# Patient Record
Sex: MALE | Race: AMERICAN INDIAN OR ALASKA NATIVE
[De-identification: names, ages, dates, MRNs, and addresses within clinical notes are randomized per-mention and may not be internally consistent; named-entity substitution may affect disease eponyms.]

---

## 2019-07-16 ENCOUNTER — HOSPITAL ENCOUNTER (EMERGENCY)
Dept: HOSPITAL 5 - ED | Age: 39
LOS: 1 days | Discharge: HOME | End: 2019-07-17
Payer: COMMERCIAL

## 2019-07-16 VITALS — DIASTOLIC BLOOD PRESSURE: 76 MMHG | SYSTOLIC BLOOD PRESSURE: 105 MMHG

## 2019-07-16 DIAGNOSIS — S09.90XA: ICD-10-CM
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
## 2019-07-16 DIAGNOSIS — M54.9: ICD-10-CM

## 2019-07-16 DIAGNOSIS — F17.200: ICD-10-CM
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
## 2019-07-16 DIAGNOSIS — S16.1XXA: Primary | ICD-10-CM

## 2019-07-16 PROCEDURE — 72125 CT NECK SPINE W/O DYE: CPT

## 2019-07-16 PROCEDURE — 70450 CT HEAD/BRAIN W/O DYE: CPT
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
## 2020-08-06 ENCOUNTER — HOSPITAL ENCOUNTER (EMERGENCY)
Dept: HOSPITAL 5 - ED | Age: 40
Discharge: LEFT BEFORE DISCHARGE | End: 2020-08-06
Payer: SELF-PAY

## 2020-08-06 VITALS — SYSTOLIC BLOOD PRESSURE: 141 MMHG | DIASTOLIC BLOOD PRESSURE: 101 MMHG

## 2020-08-06 DIAGNOSIS — Z53.21: ICD-10-CM

## 2020-08-06 DIAGNOSIS — M54.9: Primary | ICD-10-CM

## 2020-08-06 NOTE — EMERGENCY DEPARTMENT REPORT
Chief Complaint: Back Pain/Injury


Stated Complaint: BACK PAIN





- HPI


History of Present Illness: 





40-year-old -American male presents to the emergency room stating that he

hurt his back this morning while moving a washing machine and dryer.  Patient 

states that it is more on his left side.  Patient reports is worse with twisting

and movement.  Patient denies any urinary or bowel incontinent.  Patient is 

taken nothing for his pain.  Patient denies any direct trauma to his back.  

Patient denies any past medical history takes no medications on a daily basis 

and has no known drug allergies.





- Exam


Vital Signs: 


                                   Vital Signs











  08/06/20





  09:24


 


Temperature 98.3 F


 


Pulse Rate 95 H


 


Respiratory 20





Rate 


 


Blood Pressure 141/101


 


O2 Sat by Pulse 99





Oximetry 











Physical Exam: 





Alert and oriented x3 no acute distress


Back full range of motion no vertebral tenderness left side muscle tenderness


Ambulatory without difficulties.


MSE screening note: 


Focused history and physical exam performed.


Due to findings the following was ordered:





40-year-old -American male presents to the emergency room stating that he

hurt his back this morning while moving a washing machine and dryer.  Patient 

states that it is more on his left side.  Patient reports is worse with twisting

and movement.  Patient denies any urinary or bowel incontinent.  Patient is 

taken nothing for his pain.  Patient denies any direct trauma to his back.  

Patient denies any past medical history takes no medications on a daily basis 

and has no known drug allergies.














You can take over-the-counter ibuprofen and increase your water intake and 

follow-up with your primary care provider.





ED Disposition [place] [facility]


Disposition: Z-07 MED SCREENING EXAM-LEFT


Is pt being admited?: No


Does the pt Need Aspirin: No


Condition: Stable


Instructions:  Low Back Strain (ED)


Additional Instructions: 


You can take over-the-counter ibuprofen and increase your water intake and 

follow-up with your primary care provider.


Referrals: 


[facility] [Primary Care Provider] - 3-5 Days


[facility] [Outside] - 3-5 Days


[facility] [Provider Group] - 3-5 Days

## 2021-07-27 ENCOUNTER — HOSPITAL ENCOUNTER (INPATIENT)
Dept: HOSPITAL 5 - ED | Age: 41
LOS: 7 days | Discharge: HOME | DRG: 326 | End: 2021-08-03
Attending: INTERNAL MEDICINE | Admitting: INTERNAL MEDICINE
Payer: SELF-PAY

## 2021-07-27 DIAGNOSIS — E86.0: ICD-10-CM

## 2021-07-27 DIAGNOSIS — Z79.899: ICD-10-CM

## 2021-07-27 DIAGNOSIS — K25.5: Primary | ICD-10-CM

## 2021-07-27 DIAGNOSIS — K65.9: ICD-10-CM

## 2021-07-27 DIAGNOSIS — K56.7: ICD-10-CM

## 2021-07-27 DIAGNOSIS — Z82.49: ICD-10-CM

## 2021-07-27 LAB
ALBUMIN SERPL-MCNC: 4.4 G/DL (ref 3.9–5)
ALT SERPL-CCNC: 21 UNITS/L (ref 7–56)
BAND NEUTROPHILS # (MANUAL): 1 K/MM3
BILIRUB DIRECT SERPL-MCNC: < 0.2 MG/DL (ref 0–0.2)
BILIRUB UR QL STRIP: (no result)
BLOOD UR QL VISUAL: (no result)
BUN SERPL-MCNC: 12 MG/DL (ref 9–20)
BUN/CREAT SERPL: 10 %
CALCIUM SERPL-MCNC: 9.6 MG/DL (ref 8.4–10.2)
HCT VFR BLD CALC: 47.6 % (ref 35.5–45.6)
HEMOLYSIS INDEX: 37
HGB BLD-MCNC: 15.9 GM/DL (ref 11.8–15.2)
MCHC RBC AUTO-ENTMCNC: 33 % (ref 32–34)
MCV RBC AUTO: 93 FL (ref 84–94)
MUCOUS THREADS #/AREA URNS HPF: (no result) /HPF
MYELOCYTES # (MANUAL): 0 K/MM3
PH UR STRIP: 5 [PH] (ref 5–7)
PLATELET # BLD: 301 K/MM3 (ref 140–440)
PROMYELOCYTES # (MANUAL): 0 K/MM3
PROT UR STRIP-MCNC: (no result) MG/DL
RBC # BLD AUTO: 5.12 M/MM3 (ref 3.65–5.03)
RBC #/AREA URNS HPF: < 1 /HPF (ref 0–6)
TOTAL CELLS COUNTED BLD: 100
UROBILINOGEN UR-MCNC: < 2 MG/DL (ref ?–2)
WBC #/AREA URNS HPF: < 1 /HPF (ref 0–6)

## 2021-07-27 PROCEDURE — 99292 CRITICAL CARE ADDL 30 MIN: CPT

## 2021-07-27 PROCEDURE — 87075 CULTR BACTERIA EXCEPT BLOOD: CPT

## 2021-07-27 PROCEDURE — 85025 COMPLETE CBC W/AUTO DIFF WBC: CPT

## 2021-07-27 PROCEDURE — 74177 CT ABD & PELVIS W/CONTRAST: CPT

## 2021-07-27 PROCEDURE — 80048 BASIC METABOLIC PNL TOTAL CA: CPT

## 2021-07-27 PROCEDURE — 74019 RADEX ABDOMEN 2 VIEWS: CPT

## 2021-07-27 PROCEDURE — 83690 ASSAY OF LIPASE: CPT

## 2021-07-27 PROCEDURE — 83036 HEMOGLOBIN GLYCOSYLATED A1C: CPT

## 2021-07-27 PROCEDURE — C9113 INJ PANTOPRAZOLE SODIUM, VIA: HCPCS

## 2021-07-27 PROCEDURE — 74220 X-RAY XM ESOPHAGUS 1CNTRST: CPT

## 2021-07-27 PROCEDURE — 74246 X-RAY XM UPR GI TRC 2CNTRST: CPT

## 2021-07-27 PROCEDURE — 36415 COLL VENOUS BLD VENIPUNCTURE: CPT

## 2021-07-27 PROCEDURE — 81001 URINALYSIS AUTO W/SCOPE: CPT

## 2021-07-27 PROCEDURE — 80053 COMPREHEN METABOLIC PANEL: CPT

## 2021-07-27 PROCEDURE — 71045 X-RAY EXAM CHEST 1 VIEW: CPT

## 2021-07-27 PROCEDURE — 74022 RADEX COMPL AQT ABD SERIES: CPT

## 2021-07-27 PROCEDURE — 87116 MYCOBACTERIA CULTURE: CPT

## 2021-07-27 PROCEDURE — 96375 TX/PRO/DX INJ NEW DRUG ADDON: CPT

## 2021-07-27 PROCEDURE — 80307 DRUG TEST PRSMV CHEM ANLYZR: CPT

## 2021-07-27 PROCEDURE — 96372 THER/PROPH/DIAG INJ SC/IM: CPT

## 2021-07-27 PROCEDURE — 80076 HEPATIC FUNCTION PANEL: CPT

## 2021-07-27 PROCEDURE — 96365 THER/PROPH/DIAG IV INF INIT: CPT

## 2021-07-27 PROCEDURE — 0DU607Z SUPPLEMENT STOMACH WITH AUTOLOGOUS TISSUE SUBSTITUTE, OPEN APPROACH: ICD-10-PCS | Performed by: SURGERY

## 2021-07-27 PROCEDURE — 85007 BL SMEAR W/DIFF WBC COUNT: CPT

## 2021-07-27 RX ADMIN — HYDROMORPHONE HYDROCHLORIDE PRN MG: 1 INJECTION, SOLUTION INTRAMUSCULAR; INTRAVENOUS; SUBCUTANEOUS at 20:17

## 2021-07-27 RX ADMIN — HYDROMORPHONE HYDROCHLORIDE PRN MG: 1 INJECTION, SOLUTION INTRAMUSCULAR; INTRAVENOUS; SUBCUTANEOUS at 20:07
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
## 2021-07-27 NOTE — CAT SCAN REPORT
CT ABDOMEN AND PELVIS WITH IV CONTRAST



INDICATION:

abd pain omni 300 100ml . Known pneumoperitoneum



COMPARISON:

None available.



TECHNIQUE:

Axial CT images were obtained through the abdomen and pelvis after 100 mL IV contrast. All CT scans a
t this location are performed using CT dose reduction for ALARA by means of automated exposure contro
l. 



FINDINGS --



Abdomen and pelvis: Severe and extensive free intraperitoneal air and fluid identified throughout the
 abdomen especially the upper abdomen. There is patchy bibasilar consolidation within both lower lung
s, right worse than left consistent with mild pneumonitis/atelectasis. There is free gas adjacent to 
the distal thoracic esophagus just above the level of the esophageal hiatus.



There are multiple dilated loops of small bowel within the left mid and lower abdomen. The duodenum a
ppears dilated. A transition is difficult to discern but appears to be near the midline mid abdomen.



The liver, spleen, pancreas adrenal glands and kidneys are grossly unremarkable within the limits of 
the exam given significant amount of respiratory motion artifact. The gallbladder demonstrates modera
te pericholecystic fluid however this could be secondary to significant free intra-abdominal fluid. T
he proximal duodenum is fluid distended. Multiple cysts are present within the kidneys. Abdominal aor
ta is normal in size. The appendix is poorly identified on this exam. The urinary bladder is fluid di
stended. Moderate free pelvic fluid is noted. A+ free gas is present within the lower pelvis.



Review of bone windows demonstrates mild thoracolumbar degenerative change.



IMPRESSION:

1. Severe and extensive pneumoperitoneum with free fluid throughout much of the abdomen but especiall
y the right upper abdomen. Overall the findings are most consistent with a perforated viscus within t
he upper GI tract however the exact location is somewhat uncertain especially given the degree of mot
ion artifact. Primary considerations of areas of possible perforation include the proximal duodenum (
perforated ulcer) versus the distal thoracic esophagus. Upper endoscopy may be useful for further turner
luation.





2. Multiple dilated loops of small bowel primarily involving the left mid abdomen with gradual transi
tion somewhere near the mid abdomen. It is unclear if this dilatation is primarily secondary to acute
 peritonitis from perforated viscus versus newly developing small bowel obstruction.



Signer Name: Jalen Murray MD 

Signed: 7/27/2021 11:29 AM

Workstation Name: [email or web address]

## 2021-07-27 NOTE — XRAY REPORT
CHEST 1 VIEW 7/27/2021 4:18 PM



INDICATION / CLINICAL INFORMATION: r/o esophageal perforation.



COMPARISON: July 27 at 10:28 AM



FINDINGS:



SUPPORT DEVICES: None.

HEART / MEDIASTINUM: No significant abnormality. 

LUNGS / PLEURA: There is increased opacity within the right lung base, likely representing compressiv
e atelectasis No pneumothorax. 



ADDITIONAL FINDINGS: Redemonstrated subdiaphragmatic air. Contrast material is noted within the stoma
ch extending into the duodenum.



IMPRESSION:

1. Redemonstrated pneumoperitoneum. Increased opacity within the right lung base, likely representing
 compressive atelectasis.



Signer Name: Maury Connelly DO 

Signed: 7/27/2021 5:40 PM

Workstation Name: [id number]

## 2021-07-27 NOTE — CONSULTATION
History of Present Illness


Consult date: 07/27/21


Reason for consult: abdominal pain





- History of present illness


History of present illness: 





40 yo male presents to the ED with 3 hour h/o diffuse abdominal pain, nausea and

vomiting after eating yogurt and milk.  Denies hematemesis, melena, significant 

alcohol intake or h/o PUD.  No OTC NSAID use.  





Medications and Allergies


                                    Allergies











Allergy/AdvReac Type Severity Reaction Status Date / Time


 


No Known Allergies Allergy   Verified 08/06/20 09:22











                                Home Medications











 Medication  Instructions  Recorded  Confirmed  Last Taken  Type


 


HYDROcodone/APAP 5-325 [[place] 1 each PO Q6HR PRN #12 tablet 10/03/18  Unknown Rx





5/325]     


 


Sulfamethoxazole/Trimethoprim 1 each PO BID #14 tablet 10/03/18  Unknown Rx





[Bactrim DS TAB]     


 


Ketorolac [Toradol] 10 mg PO Q6H PRN #14 tablet 10/13/18  Unknown Rx


 


methOCARBAMOL [Robaxin TAB] 500 mg PO Q6H PRN #20 tablet 10/13/18  Unknown Rx


 


Baclofen [Lioresal] 10 mg PO TID #15 tab 07/17/19  Unknown Rx


 


Ibuprofen [Motrin 600 MG tab] 600 mg PO Q8H PRN #30 tablet 07/17/19  Unknown Rx











Active Meds: 


Active Medications





Sodium Chloride (Nacl 0.9% 1000 Ml)  1,000 mls @ 125 mls/hr IV ONCE ONE


   Stop: 07/27/21 22:50











Review of Systems


All systems: negative (none)





Exam


                                   Vital Signs











Pulse Ox


 


 64 L


 


 07/27/21 09:01














- General physical appearance


Positive: well developed, well nourished, no distress





- Eyes


Positive: PERRL, normal occular movement





- ENT


Positive: normal pinna, normal nares, normal mucosa, no hearing loss, no 

congestion





- Neck


Positive: no masses, no bruits, trachea midline, no venous distension





- Respiratory


Positive: normal expansion, normal respiratory effort, clear to auscultation





- Cardiovascular


Rhythm: regular


Heart Sounds: Present: S1 & S2.  Absent: rub, click





- Extremities


Extremities: no ischemia, pulses symmetrical, No edema





- Breasts


Breasts: normal, no mass, no skin changes





- Abdomen


Abdomen: Present: other (Flat with diffuse tenderness, rebound and guarding.  BS

hypoactive.  No hernias.)


Hernia: none





- Genitourinary


Male Genitourinary: normal


Female Genitourinary: normal





- Integumentary


no rash, no growths, no abnormal pigmentation





- Neurologic


Neurologic: alert and oriented to time, place and person, motor strength and 

sensation are grossly intact





- Musculoskeletal


normal gait, normal posture





- Psychiatric


Psychiatric: appropriate mood/affect, intact judgment & insight





Results





- Labs





                                 07/27/21 09:44





                                 07/27/21 09:44


                              Abnormal lab results











  07/27/21 07/27/21 Range/Units





  09:44 09:44 


 


RBC  5.12 H   (3.65-5.03)  M/mm3


 


Hgb  15.9 H   (11.8-15.2)  gm/dl


 


Hct  47.6 H   (35.5-45.6)  %


 


RDW  16.3 H   (13.2-15.2)  %


 


Seg Neuts % (Manual)  75.0 H   (40.0-70.0)  %


 


Lymphocytes % (Manual)  9.0 L   (13.4-35.0)  %


 


Lymphocytes # (Manual)  0.6 L   (1.2-5.4)  K/mm3


 


Glucose   133 H  ()  mg/dL


 


Lipase   99 H  (13-60)  units/L








                                 Diabetes panel











  07/27/21 Range/Units





  09:44 


 


Sodium  137  (137-145)  mmol/L


 


Potassium  3.9  (3.6-5.0)  mmol/L


 


Chloride  98.6  ()  mmol/L


 


Carbon Dioxide  23  (22-30)  mmol/L


 


BUN  12  (9-20)  mg/dL


 


Creatinine  1.2  (0.8-1.3)  mg/dL


 


Glucose  133 H  ()  mg/dL


 


Calcium  9.6  (8.4-10.2)  mg/dL


 


AST  40  (5-40)  units/L


 


ALT  21  (7-56)  units/L


 


Alkaline Phosphatase  56  ()  units/L


 


Total Protein  6.8  (6.3-8.2)  g/dL


 


Albumin  4.4  (3.9-5)  g/dL








                                  Calcium panel











  07/27/21 Range/Units





  09:44 


 


Calcium  9.6  (8.4-10.2)  mg/dL


 


Albumin  4.4  (3.9-5)  g/dL








                                 Pituitary panel











  07/27/21 Range/Units





  09:44 


 


Sodium  137  (137-145)  mmol/L


 


Potassium  3.9  (3.6-5.0)  mmol/L


 


Chloride  98.6  ()  mmol/L


 


Carbon Dioxide  23  (22-30)  mmol/L


 


BUN  12  (9-20)  mg/dL


 


Creatinine  1.2  (0.8-1.3)  mg/dL


 


Glucose  133 H  ()  mg/dL


 


Calcium  9.6  (8.4-10.2)  mg/dL








                                  Adrenal panel











  07/27/21 Range/Units





  09:44 


 


Sodium  137  (137-145)  mmol/L


 


Potassium  3.9  (3.6-5.0)  mmol/L


 


Chloride  98.6  ()  mmol/L


 


Carbon Dioxide  23  (22-30)  mmol/L


 


BUN  12  (9-20)  mg/dL


 


Creatinine  1.2  (0.8-1.3)  mg/dL


 


Glucose  133 H  ()  mg/dL


 


Calcium  9.6  (8.4-10.2)  mg/dL


 


Total Bilirubin  0.60  (0.1-1.2)  mg/dL


 


AST  40  (5-40)  units/L


 


ALT  21  (7-56)  units/L


 


Alkaline Phosphatase  56  ()  units/L


 


Total Protein  6.8  (6.3-8.2)  g/dL


 


Albumin  4.4  (3.9-5)  g/dL














- Imaging


Chest x-ray: image reviewed


Abdominal x-ray: image reviewed


CT scan - abdomen: report reviewed


CT scan - pelvis: report reviewed





Assessment and Plan





- Patient Problems


(1) Perforated abdominal viscus


Current Visit: Yes   Status: Acute   


Plan to address problem: 


1) IV Zosyn


 2) IV Protonix


 3) To OR for exploratory laparotomy and indicated surgery.

## 2021-07-27 NOTE — PROCEDURE NOTE
Date of procedure: 07/27/21


Pre-op diagnosis: Perforated viscous with pneumoperitoneum


Post-op diagnosis: same (secondary to perforated gastric ulcer)


Procedure: 





1) Oversewing of perforated 


2) Omentoplasty





Description of procedure:  Pt was placed supine on the OR table.  GETA was 

administered.  Abdomen was prepped and draped.  Peritoneal cavity was entered 

via a vertical midline incision.  A large amount of bile colored fluid was 

present in the peritoneal cavity.  This was collected for C&S and suctioned.  

The perforation was immediately identified in the anterior wall of the proximal 

gastric antrum.  The perforation was approximately 8 mm in diameter.  The 

perforation was closed with 2 sutures of 2-0 silk.  An omentoplasty was then 

performed over the perforation by securing the omentum over the perforation 

using the previously placed sutures of 2-0 silk.  Peritoneal cavity was 

irrigated with 2 liters of warm saline.  Midline fascia was closed with a 

running, looped 0-PDS suture.  SQ tissue was packed open with a dilute Betadine 

moistened Kerlix roll followed by dry 4 X 4's, ABD and Medipore tape.  Pt 

tolerated the procedure well.  Pt was extubated in the OR and was taken to PACU 

in stable condtion.


Anesthesia: GETA


Surgeon: MARISELA OJEDA


Estimated blood loss: minimal


Pathology: list (C&S of free intra-peritoneal fluid)


Specimen disposition: to lab


Condition: stable


Disposition: PACU

## 2021-07-27 NOTE — XRAY REPORT
XR abd series w cxr 1V



INDICATION / CLINICAL INFORMATION:

abd pain.



COMPARISON: 

None available.



FINDINGS:



SUPPORT DEVICES: None.



HEART / MEDIASTINUM: No significant abnormality. 



LUNGS / PLEURA: Lungs are clear.  Costophrenic sulci are sharp. No pneumothorax.



ABDOMEN: Free air noted underlying the right and left hemidiaphragms. No other abnormal findings with
in the abdomen.



ADDITIONAL FINDINGS: No significant additional findings.



IMPRESSION:

Pneumoperitoneum.



============================================

CRITICAL RESULT

Time of Communication (CST/CDT): 9:54 AM

Licensed Practitioner Receiving Report: Dr. Coronado 

Read-Back Performed: Yes.





============================================



Signer Name: Miquel Sauceda MD 

Signed: 7/27/2021 10:54 AM

Workstation Name: [email or web address]

## 2021-07-27 NOTE — ANESTHESIA CONSULTATION
Anesthesia Consult and Med Hx


Date of service: 07/27/21





- Airway


Anesthetic Teeth Evaluation: Caps, Crowns


ROM Head & Neck: Adequate


Mental/Hyoid Distance: Adequate


Mallampati Class: Class II


Intubation Access Assessment: Probably Good





- Pre-Operative Health Status


ASA Pre-Surgery Classification: ASA2, Emergency


Proposed Anesthetic Plan: General





- Pre-Anesthesia Comment


Pre-Anesthesia Comments: Pt not very cooperative and not answering questions. 

Information primarily from chart





- Central Nervous System


Hx Back Pain: Yes

## 2021-07-27 NOTE — ANESTHESIA DAY OF SURGERY
Anesthesia Day of Surgery





- Day of Surgery


Patient Examined: Yes


Patient H&P Reviewed: Yes


Patient is NPO: No (Had oral contrast)

## 2021-07-27 NOTE — FLUOROSCOPY REPORT
BARIUM SWALLOW



HISTORY: Severe abdominal pain, free air on recent CT, evaluate for esophageal perforation



FINDINGS: Multiple attempts were made to get the patient to ingest Gastrografin to evaluate for an es
ophageal leak/perforation. The patient was unable to ingest the oral contrast agent secondary to liang
re pain. These findings were discussed with Dr. Coronado in the emergency department.



IMPRESSION:

Unsuccessful exam due to patient condition. See above.



Fluoroscopy time: 0.3 minutes.

Fluoroscopic images:1



Signer Name: Mor Dale Jr, MD 

Signed: 7/27/2021 1:54 PM

Workstation Name: [id number]

## 2021-07-28 LAB
BASOPHILS # (AUTO): 0 K/MM3 (ref 0–0.1)
BASOPHILS NFR BLD AUTO: 0.1 % (ref 0–1.8)
BUN SERPL-MCNC: 11 MG/DL (ref 9–20)
BUN/CREAT SERPL: 12 %
CALCIUM SERPL-MCNC: 8.3 MG/DL (ref 8.4–10.2)
EOSINOPHIL # BLD AUTO: 0 K/MM3 (ref 0–0.4)
EOSINOPHIL NFR BLD AUTO: 0 % (ref 0–4.3)
HCT VFR BLD CALC: 46.2 % (ref 35.5–45.6)
HEMOLYSIS INDEX: 7
HGB BLD-MCNC: 15.6 GM/DL (ref 11.8–15.2)
LYMPHOCYTES # BLD AUTO: 0.6 K/MM3 (ref 1.2–5.4)
LYMPHOCYTES NFR BLD AUTO: 8.5 % (ref 13.4–35)
MCHC RBC AUTO-ENTMCNC: 34 % (ref 32–34)
MCV RBC AUTO: 93 FL (ref 84–94)
MONOCYTES # (AUTO): 0.7 K/MM3 (ref 0–0.8)
MONOCYTES % (AUTO): 9.3 % (ref 0–7.3)
PLATELET # BLD: 241 K/MM3 (ref 140–440)
RBC # BLD AUTO: 4.99 M/MM3 (ref 3.65–5.03)

## 2021-07-28 RX ADMIN — Medication SCH ML: at 22:35

## 2021-07-28 RX ADMIN — MORPHINE SULFATE PRN MG: 2 INJECTION, SOLUTION INTRAMUSCULAR; INTRAVENOUS at 06:43

## 2021-07-28 RX ADMIN — HYDROMORPHONE HYDROCHLORIDE PRN MG: 1 INJECTION, SOLUTION INTRAMUSCULAR; INTRAVENOUS; SUBCUTANEOUS at 11:15

## 2021-07-28 RX ADMIN — DEXTROSE AND SODIUM CHLORIDE SCH MLS/HR: 5; .9 INJECTION, SOLUTION INTRAVENOUS at 22:40

## 2021-07-28 RX ADMIN — PIPERACILLIN AND TAZOBACTAM SCH MLS/HR: 4; .5 INJECTION, POWDER, FOR SOLUTION INTRAVENOUS at 22:34

## 2021-07-28 RX ADMIN — HYDROMORPHONE HYDROCHLORIDE PRN MG: 1 INJECTION, SOLUTION INTRAMUSCULAR; INTRAVENOUS; SUBCUTANEOUS at 22:35

## 2021-07-28 RX ADMIN — PANTOPRAZOLE SODIUM SCH MG: 40 INJECTION, POWDER, FOR SOLUTION INTRAVENOUS at 22:34

## 2021-07-28 RX ADMIN — MORPHINE SULFATE PRN MG: 2 INJECTION, SOLUTION INTRAMUSCULAR; INTRAVENOUS at 19:35

## 2021-07-28 RX ADMIN — DEXTROSE AND SODIUM CHLORIDE SCH MLS/HR: 5; .9 INJECTION, SOLUTION INTRAVENOUS at 06:42

## 2021-07-28 RX ADMIN — PANTOPRAZOLE SODIUM SCH MG: 40 INJECTION, POWDER, FOR SOLUTION INTRAVENOUS at 22:33

## 2021-07-28 RX ADMIN — PIPERACILLIN AND TAZOBACTAM SCH MLS/HR: 4; .5 INJECTION, POWDER, FOR SOLUTION INTRAVENOUS at 13:56

## 2021-07-28 RX ADMIN — PANTOPRAZOLE SODIUM SCH MG: 40 INJECTION, POWDER, FOR SOLUTION INTRAVENOUS at 13:49

## 2021-07-28 RX ADMIN — Medication SCH ML: at 13:50

## 2021-07-28 NOTE — PROGRESS NOTE
Assessment and Plan


Assessment and plan: 





(1) Perforated abdominal viscus


Current Visit: Yes   Status: Acute   


Plan to address problem: 


Patient was taken for emergent abdominal surgery for repair of perforated viscus


Perforated viscus may be in the duodenum or the lower esophagus


Dr. Manley consulted


N.p.o. for now


IVF Zosyn for now








(2) Dehydration


Current Visit: Yes   Status: Acute   


Plan to address problem: 


IV fluids for now








(3) Peritonitis


Current Visit: Yes   Status: Acute   


Plan to address problem: 


Secondary to perforation


IV Zosyn for now








(4) DVT prophylaxis


Current Visit: Yes   Status: Acute   


Plan to address problem: 


On SCDs and GI prophylaxis





7/28


-Status post patch worsening of the perforated gastric ulcer and omentoplasty by

Dr. Manley


-Continue IV antibiotics for now


-We will follow Dr. Manley's recommendation


-I ordered IV PPI


-Cocaine use; counseled about cessation of using cocaine





History


Interval history: 


Patient was seen and evaluated this morning


Patient said he has abdominal pain








Hospitalist Physical





- Physical exam


Narrative exam: 





 Not in cardiopulmonary distress. 


 The patient appeared well nourished and normally developed.


 Vital signs as documented.


 Head exam is unremarkable.


 No scleral icterus .


 Neck is without jugular venous distension, thyromegaly, or carotid bruits. 


 Lungs are clear to auscultation.


Cardiac exam reveals regular rate and  Rhythm. 


Abdominal exam reveals moderate tenderness.


Extremities are nonedematous and both femoral and pedal pulses are normal.


CNS: Alert and oriented 3.  No focal weakness.





- Constitutional


Vitals: 


                                        











Temp Pulse Resp BP Pulse Ox


 


 98.7 F   90   18   105/70   95 


 


 07/28/21 07:54  07/28/21 07:54  07/28/21 07:54  07/28/21 07:54  07/28/21 07:54











General appearance: Present: no acute distress, well-nourished





Results





- Labs


CBC & Chem 7: 


                                 07/28/21 06:41





                                 07/28/21 06:41


Labs: 


                             Laboratory Last Values











WBC  7.0 K/mm3 (4.5-11.0)   07/28/21  06:41    


 


RBC  4.99 M/mm3 (3.65-5.03)   07/28/21  06:41    


 


Hgb  15.6 gm/dl (11.8-15.2)  H  07/28/21  06:41    


 


Hct  46.2 % (35.5-45.6)  H  07/28/21  06:41    


 


MCV  93 fl (84-94)   07/28/21  06:41    


 


MCH  31 pg (28-32)   07/28/21  06:41    


 


MCHC  34 % (32-34)   07/28/21  06:41    


 


RDW  16.0 % (13.2-15.2)  H  07/28/21  06:41    


 


Plt Count  241 K/mm3 (140-440)   07/28/21  06:41    


 


Lymph % (Auto)  8.5 % (13.4-35.0)  L  07/28/21  06:41    


 


Mono % (Auto)  9.3 % (0.0-7.3)  H  07/28/21  06:41    


 


Eos % (Auto)  0.0 % (0.0-4.3)   07/28/21  06:41    


 


Baso % (Auto)  0.1 % (0.0-1.8)   07/28/21  06:41    


 


Lymph # (Auto)  0.6 K/mm3 (1.2-5.4)  L  07/28/21  06:41    


 


Mono # (Auto)  0.7 K/mm3 (0.0-0.8)   07/28/21  06:41    


 


Eos # (Auto)  0.0 K/mm3 (0.0-0.4)   07/28/21  06:41    


 


Baso # (Auto)  0.0 K/mm3 (0.0-0.1)   07/28/21  06:41    


 


Add Manual Diff  Complete   07/27/21  09:44    


 


Total Counted  100   07/27/21  09:44    


 


Seg Neutrophils %  82.1 % (40.0-70.0)  H  07/28/21  06:41    


 


Seg Neuts % (Manual)  75.0 % (40.0-70.0)  H  07/27/21  09:44    


 


Band Neutrophils %  14.0 %  07/27/21  09:44    


 


Lymphocytes % (Manual)  9.0 % (13.4-35.0)  L  07/27/21  09:44    


 


Monocytes % (Manual)  2.0 % (0.0-7.3)   07/27/21  09:44    


 


Nucleated RBC %  Not Reportable   07/27/21  09:44    


 


Seg Neutrophils #  5.7 K/mm3 (1.8-7.7)   07/28/21  06:41    


 


Seg Neutrophils # Man  5.3 K/mm3 (1.8-7.7)   07/27/21  09:44    


 


Band Neutrophils #  1.0 K/mm3  07/27/21  09:44    


 


Lymphocytes # (Manual)  0.6 K/mm3 (1.2-5.4)  L  07/27/21  09:44    


 


Abs React Lymphs (Man)  0.0 K/mm3  07/27/21  09:44    


 


Monocytes # (Manual)  0.1 K/mm3 (0.0-0.8)   07/27/21  09:44    


 


Eosinophils # (Manual)  0.0 K/mm3 (0.0-0.4)   07/27/21  09:44    


 


Basophils # (Manual)  0.0 K/mm3 (0.0-0.1)   07/27/21  09:44    


 


Metamyelocytes #  0.0 K/mm3  07/27/21  09:44    


 


Myelocytes #  0.0 K/mm3  07/27/21  09:44    


 


Promyelocytes #  0.0 K/mm3  07/27/21  09:44    


 


Blast Cells #  0.0 K/mm3  07/27/21  09:44    


 


WBC Morphology  Not Reportable   07/27/21  09:44    


 


Hypersegmented Neuts  Not Reportable   07/27/21  09:44    


 


Hyposegmented Neuts  Not Reportable   07/27/21  09:44    


 


Hypogranular Neuts  Not Reportable   07/27/21  09:44    


 


Smudge Cells  Not Reportable   07/27/21  09:44    


 


Toxic Granulation  Not Reportable   07/27/21  09:44    


 


Toxic Vacuolation  Not Reportable   07/27/21  09:44    


 


Dohle Bodies  Not Reportable   07/27/21  09:44    


 


Pelger-Huet Anomaly  Not Reportable   07/27/21  09:44    


 


Kerline Rods  Not Reportable   07/27/21  09:44    


 


Platelet Estimate  Consistent w auto   07/27/21  09:44    


 


Clumped Platelets  Not Reportable   07/27/21  09:44    


 


Plt Clumps, EDTA  Not Reportable   07/27/21  09:44    


 


Large Platelets  Not Reportable   07/27/21  09:44    


 


Giant Platelets  Not Reportable   07/27/21  09:44    


 


Platelet Satelliting  Not Reportable   07/27/21  09:44    


 


Plt Morphology Comment  Not Reportable   07/27/21  09:44    


 


RBC Morphology  Normal   07/27/21  09:44    


 


Dimorphic RBCs  Not Reportable   07/27/21  09:44    


 


Polychromasia  Not Reportable   07/27/21  09:44    


 


Hypochromasia  Not Reportable   07/27/21  09:44    


 


Poikilocytosis  Not Reportable   07/27/21  09:44    


 


Anisocytosis  Not Reportable   07/27/21  09:44    


 


Microcytosis  Not Reportable   07/27/21  09:44    


 


Macrocytosis  Not Reportable   07/27/21  09:44    


 


Spherocytes  Not Reportable   07/27/21  09:44    


 


Pappenheimer Bodies  Not Reportable   07/27/21  09:44    


 


Sickle Cells  Not Reportable   07/27/21  09:44    


 


Target Cells  Not Reportable   07/27/21  09:44    


 


Tear Drop Cells  Not Reportable   07/27/21  09:44    


 


Ovalocytes  Not Reportable   07/27/21  09:44    


 


Helmet Cells  Not Reportable   07/27/21  09:44    


 


[place] Bodies  Not Reportable   07/27/21  09:44    


 


Cabot Rings  Not Reportable   07/27/21  09:44    


 


Glenn Cells  Not Reportable   07/27/21  09:44    


 


Bite Cells  Not Reportable   07/27/21  09:44    


 


Crenated Cell  Not Reportable   07/27/21  09:44    


 


Elliptocytes  Not Reportable   07/27/21  09:44    


 


Acanthocytes (Spur)  Not Reportable   07/27/21  09:44    


 


Rouleaux  Not Reportable   07/27/21  09:44    


 


Hemoglobin C Crystals  Not Reportable   07/27/21  09:44    


 


Schistocytes  Not Reportable   07/27/21  09:44    


 


Malaria parasites  Not Reportable   07/27/21  09:44    


 


Garo Bodies  Not Reportable   07/27/21  09:44    


 


Hem Pathologist Commnt  No   07/27/21  09:44    


 


Sodium  136 mmol/L (137-145)  L  07/28/21  06:41    


 


Potassium  4.5 mmol/L (3.6-5.0)   07/28/21  06:41    


 


Chloride  102.8 mmol/L ()   07/28/21  06:41    


 


Carbon Dioxide  22 mmol/L (22-30)   07/28/21  06:41    


 


Anion Gap  16 mmol/L  07/28/21  06:41    


 


BUN  11 mg/dL (9-20)   07/28/21  06:41    


 


Creatinine  0.9 mg/dL (0.8-1.3)   07/28/21  06:41    


 


Estimated GFR  > 60 ml/min  07/28/21  06:41    


 


BUN/Creatinine Ratio  12 %  07/28/21  06:41    


 


Glucose  117 mg/dL ()  H  07/28/21  06:41    


 


Calcium  8.3 mg/dL (8.4-10.2)  L  07/28/21  06:41    


 


Total Bilirubin  0.60 mg/dL (0.1-1.2)   07/27/21  09:44    


 


Direct Bilirubin  < 0.2 mg/dL (0-0.2)   07/27/21  09:44    


 


Indirect Bilirubin  0.4 mg/dL  07/27/21  09:44    


 


AST  40 units/L (5-40)   07/27/21  09:44    


 


ALT  21 units/L (7-56)   07/27/21  09:44    


 


Alkaline Phosphatase  56 units/L ()   07/27/21  09:44    


 


Total Protein  6.8 g/dL (6.3-8.2)   07/27/21  09:44    


 


Albumin  4.4 g/dL (3.9-5)   07/27/21  09:44    


 


Albumin/Globulin Ratio  1.8 %  07/27/21  09:44    


 


Lipase  99 units/L (13-60)  H  07/27/21  09:44    


 


Urine Color  Yellow  (Yellow)   07/27/21  Unknown


 


Urine Turbidity  Clear  (Clear)   07/27/21  Unknown


 


Urine pH  5.0  (5.0-7.0)   07/27/21  Unknown


 


Ur Specific Gravity  1.035  (1.003-1.030)  H  07/27/21  Unknown


 


Urine Protein  <15 mg/dl mg/dL (Negative)   07/27/21  Unknown


 


Urine Glucose (UA)  Neg mg/dL (Negative)   07/27/21  Unknown


 


Urine Ketones  80 mg/dL (Negative)   07/27/21  Unknown


 


Urine Blood  Neg  (Negative)   07/27/21  Unknown


 


Urine Nitrite  Neg  (Negative)   07/27/21  Unknown


 


Urine Bilirubin  Neg  (Negative)   07/27/21  Unknown


 


Urine Urobilinogen  < 2.0 mg/dL (<2.0)   07/27/21  Unknown


 


Ur Leukocyte Esterase  Neg  (Negative)   07/27/21  Unknown


 


Urine WBC (Auto)  < 1.0 /HPF (0.0-6.0)   07/27/21  Unknown


 


Urine RBC (Auto)  < 1.0 /HPF (0.0-6.0)   07/27/21  Unknown


 


U Epithel Cells (Auto)  < 1.0 /HPF (0-13.0)   07/27/21  Unknown


 


Uric Acid Crystals  Few   07/27/21  Unknown


 


Urine Mucus  Few /HPF  07/27/21  Unknown


 


Urine Opiates Screen  Negative   07/27/21  Unknown


 


Urine Methadone Screen  Negative   07/27/21  Unknown


 


Ur Barbiturates Screen  Negative   07/27/21  Unknown


 


Ur Phencyclidine Scrn  Negative   07/27/21  Unknown


 


Ur Amphetamines Screen  Negative   07/27/21  Unknown


 


U Benzodiazepines Scrn  Negative   07/27/21  Unknown


 


Urine Cocaine Screen  Positive   07/27/21  Unknown


 


U Marijuana (THC) Screen  Negative   07/27/21  Unknown


 


Drugs of Abuse Note  Disclamer   07/27/21  Unknown











[place]/IV: 


                                        





Voiding Method                   Indwelling Catheter











Active Medications





- Current Medications


Current Medications: 














Generic Name Dose Route Start Last Admin





  Trade Name Freq  PRN Reason Stop Dose Admin


 


Acetaminophen  650 mg  07/27/21 21:17 





  Acetaminophen 325 Mg Tab  PO  





  Q4H PRN  





  Pain MILD(1-3)/Fever >100.5/HA  


 


Hydromorphone HCl  0.5 mg  07/27/21 21:17 





  Hydromorphone 1 Mg/1 Ml Inj  IV  





  Q3H PRN  





  Pain , Severe (7-10)  


 


Piperacillin Sod/Tazobactam Sod  3.375 gm in 50 mls @ 100 mls/hr  07/28/21 03:00







  Zosyn/Ns 3.375gm/50ml  IV  





  Q8H GL  





  Protocol  


 


Dextrose/Sodium Chloride  1,000 mls @ 75 mls/hr  07/27/21 22:00  07/28/21 06:42





  D5ns  IV   75 mls/hr





  AS DIRECT LG   Administration


 


Metoclopramide HCl  10 mg  07/27/21 21:17 





  Metoclopramide 10 Mg/2 Ml Inj  IV  





  Q6H PRN  





  Nausea And Vomiting  


 


Morphine Sulfate  2 mg  07/27/21 21:17  07/28/21 06:43





  Morphine 2 Mg/1 Ml Inj  IV   2 mg





  Q4H PRN   Administration





  Pain, Moderate (4-6)  


 


Ondansetron HCl  4 mg  07/27/21 21:17 





  Ondansetron 4 Mg/2 Ml Inj  IV  





  Q3H PRN  





  Nausea And Vomiting  


 


Pantoprazole Sodium  40 mg  07/27/21 22:00 





  Pantoprazole 40 Mg Inj  IV  





  BID LG  


 


Sodium Chloride  10 ml  07/27/21 22:00 





  Sodium Chloride 0.9% 10 Ml Flush Syringe  IV  





  BID LG  


 


Sodium Chloride  10 ml  07/27/21 21:17 





  Sodium Chloride 0.9% 10 Ml Flush Syringe  IV  





  PRN PRN  





  LINE FLUSH

## 2021-07-28 NOTE — PROGRESS NOTE
Assessment and Plan





- Patient Problems


(1) Perforated abdominal viscus


Current Visit: Yes   Status: Acute   


Plan to address problem: 


1) DC wood


 2) Ambulate in halls


 3) Continue NG


 4) CBC and BMP in the am


 5) Continue Zosyn








Subjective


Date of service: 07/28/21


Patient Reports: Positive: no new complaints, feels better, still having pain 

(Has not ambulated yet.)





Objective


                               Vital Signs - 12hr











  07/28/21 07/28/21 07/28/21





  05:15 06:43 07:54


 


Temperature 97.9 F  98.7 F


 


Pulse Rate 87  90


 


Respiratory 18 18 18





Rate   


 


Blood Pressure 114/87  105/70


 


O2 Sat by Pulse 98  95





Oximetry   














  07/28/21





  11:08


 


Temperature 98.7 F


 


Pulse Rate 101 H


 


Respiratory 18





Rate 


 


Blood Pressure 110/72


 


O2 Sat by Pulse 98





Oximetry 














- Abdomen


soft, bowel sounds hypoactive (ND, appropriately TTP)





- Labs





                                 07/28/21 06:41





                                 07/28/21 06:41


                                 Diabetes panel











  07/28/21 07/28/21 Range/Units





  06:41 06:41 


 


Sodium  136 L   (137-145)  mmol/L


 


Potassium  4.5   (3.6-5.0)  mmol/L


 


Chloride  102.8   ()  mmol/L


 


Carbon Dioxide  22   (22-30)  mmol/L


 


BUN  11   (9-20)  mg/dL


 


Creatinine  0.9   (0.8-1.3)  mg/dL


 


Glucose  117 H   ()  mg/dL


 


Hemoglobin A1c   5.8  (4-6)  %


 


Calcium  8.3 L   (8.4-10.2)  mg/dL








                                  Calcium panel











  07/28/21 Range/Units





  06:41 


 


Calcium  8.3 L  (8.4-10.2)  mg/dL








                                 Pituitary panel











  07/28/21 Range/Units





  06:41 


 


Sodium  136 L  (137-145)  mmol/L


 


Potassium  4.5  (3.6-5.0)  mmol/L


 


Chloride  102.8  ()  mmol/L


 


Carbon Dioxide  22  (22-30)  mmol/L


 


BUN  11  (9-20)  mg/dL


 


Creatinine  0.9  (0.8-1.3)  mg/dL


 


Glucose  117 H  ()  mg/dL


 


Calcium  8.3 L  (8.4-10.2)  mg/dL








                                  Adrenal panel











  07/28/21 Range/Units





  06:41 


 


Sodium  136 L  (137-145)  mmol/L


 


Potassium  4.5  (3.6-5.0)  mmol/L


 


Chloride  102.8  ()  mmol/L


 


Carbon Dioxide  22  (22-30)  mmol/L


 


BUN  11  (9-20)  mg/dL


 


Creatinine  0.9  (0.8-1.3)  mg/dL


 


Glucose  117 H  ()  mg/dL


 


Calcium  8.3 L  (8.4-10.2)  mg/dL














- Imaging


Additional Studies: 





UDS was + for cocaine.
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
## 2021-07-29 LAB
BASOPHILS # (AUTO): 0 K/MM3 (ref 0–0.1)
BASOPHILS NFR BLD AUTO: 0.1 % (ref 0–1.8)
BUN SERPL-MCNC: 12 MG/DL (ref 9–20)
BUN/CREAT SERPL: 12 %
CALCIUM SERPL-MCNC: 8.4 MG/DL (ref 8.4–10.2)
EOSINOPHIL # BLD AUTO: 0 K/MM3 (ref 0–0.4)
EOSINOPHIL NFR BLD AUTO: 0.1 % (ref 0–4.3)
HCT VFR BLD CALC: 41 % (ref 35.5–45.6)
HEMOLYSIS INDEX: 1
HGB BLD-MCNC: 13.9 GM/DL (ref 11.8–15.2)
LYMPHOCYTES # BLD AUTO: 0.7 K/MM3 (ref 1.2–5.4)
LYMPHOCYTES NFR BLD AUTO: 9.2 % (ref 13.4–35)
MCHC RBC AUTO-ENTMCNC: 34 % (ref 32–34)
MCV RBC AUTO: 92 FL (ref 84–94)
MONOCYTES # (AUTO): 0.7 K/MM3 (ref 0–0.8)
MONOCYTES % (AUTO): 9.7 % (ref 0–7.3)
PLATELET # BLD: 235 K/MM3 (ref 140–440)
RBC # BLD AUTO: 4.45 M/MM3 (ref 3.65–5.03)

## 2021-07-29 PROCEDURE — 0D9670Z DRAINAGE OF STOMACH WITH DRAINAGE DEVICE, VIA NATURAL OR ARTIFICIAL OPENING: ICD-10-PCS | Performed by: INTERNAL MEDICINE

## 2021-07-29 RX ADMIN — Medication SCH: at 11:56

## 2021-07-29 RX ADMIN — PANTOPRAZOLE SODIUM SCH MG: 40 INJECTION, POWDER, FOR SOLUTION INTRAVENOUS at 21:50

## 2021-07-29 RX ADMIN — PIPERACILLIN AND TAZOBACTAM SCH MLS/HR: 4; .5 INJECTION, POWDER, FOR SOLUTION INTRAVENOUS at 21:50

## 2021-07-29 RX ADMIN — HYDROMORPHONE HYDROCHLORIDE PRN MG: 1 INJECTION, SOLUTION INTRAMUSCULAR; INTRAVENOUS; SUBCUTANEOUS at 03:13

## 2021-07-29 RX ADMIN — PIPERACILLIN AND TAZOBACTAM SCH MLS/HR: 4; .5 INJECTION, POWDER, FOR SOLUTION INTRAVENOUS at 13:20

## 2021-07-29 RX ADMIN — DEXTROSE AND SODIUM CHLORIDE SCH MLS/HR: 5; .9 INJECTION, SOLUTION INTRAVENOUS at 11:56

## 2021-07-29 RX ADMIN — PIPERACILLIN AND TAZOBACTAM SCH MLS/HR: 4; .5 INJECTION, POWDER, FOR SOLUTION INTRAVENOUS at 05:34

## 2021-07-29 RX ADMIN — Medication SCH ML: at 21:51

## 2021-07-29 RX ADMIN — PANTOPRAZOLE SODIUM SCH MG: 40 INJECTION, POWDER, FOR SOLUTION INTRAVENOUS at 09:14

## 2021-07-29 NOTE — PROGRESS NOTE
Assessment and Plan





- Patient Problems


(1) Perforated abdominal viscus


Current Visit: Yes   Status: Acute   


Plan to address problem: 


1) Continue NG


 2) Continue Zosyn


 3) CBC, BMP & AXR in the am


 4) I am going out of town.  's Humza/Anam will assume care in my absence.


 5) Begin dressing changes to abdominal wound.








Subjective


Date of service: 07/29/21


Patient Reports: Positive: no new complaints, feels better, no flatus, no bowel 

movement (Has ambulated.)





Objective


                               Vital Signs - 12hr











  07/29/21 07/29/21 07/29/21





  04:01 04:45 07:28


 


Temperature  98.2 F 98.1 F


 


Pulse Rate  89 91 H


 


Respiratory  18 16





Rate   


 


Blood Pressure  113/77 106/70


 


O2 Sat by Pulse 98 95 95





Oximetry   














- Abdomen


soft, bowel sounds hypoactive (ND, appropriately TTP)





- Labs





                                 07/29/21 04:35





                                 07/29/21 04:35


                                 Diabetes panel











  07/29/21 Range/Units





  04:35 


 


Sodium  139  (137-145)  mmol/L


 


Potassium  3.8  (3.6-5.0)  mmol/L


 


Chloride  103.7  ()  mmol/L


 


Carbon Dioxide  26  (22-30)  mmol/L


 


BUN  12  (9-20)  mg/dL


 


Creatinine  1.0  (0.8-1.3)  mg/dL


 


Glucose  95  ()  mg/dL


 


Calcium  8.4  (8.4-10.2)  mg/dL








                                  Calcium panel











  07/29/21 Range/Units





  04:35 


 


Calcium  8.4  (8.4-10.2)  mg/dL








                                 Pituitary panel











  07/29/21 Range/Units





  04:35 


 


Sodium  139  (137-145)  mmol/L


 


Potassium  3.8  (3.6-5.0)  mmol/L


 


Chloride  103.7  ()  mmol/L


 


Carbon Dioxide  26  (22-30)  mmol/L


 


BUN  12  (9-20)  mg/dL


 


Creatinine  1.0  (0.8-1.3)  mg/dL


 


Glucose  95  ()  mg/dL


 


Calcium  8.4  (8.4-10.2)  mg/dL








                                  Adrenal panel











  07/29/21 Range/Units





  04:35 


 


Sodium  139  (137-145)  mmol/L


 


Potassium  3.8  (3.6-5.0)  mmol/L


 


Chloride  103.7  ()  mmol/L


 


Carbon Dioxide  26  (22-30)  mmol/L


 


BUN  12  (9-20)  mg/dL


 


Creatinine  1.0  (0.8-1.3)  mg/dL


 


Glucose  95  ()  mg/dL


 


Calcium  8.4  (8.4-10.2)  mg/dL

## 2021-07-29 NOTE — PROGRESS NOTE
Assessment and Plan


Assessment and plan: 





(1) Perforated abdominal viscus


Current Visit: Yes   Status: Acute   


Plan to address problem: 


Patient was taken for emergent abdominal surgery for repair of perforated viscus


Perforated viscus may be in the duodenum or the lower esophagus


Dr. Manley consulted


N.p.o. for now


IVF Zosyn for now








(2) Dehydration


Current Visit: Yes   Status: Acute   


Plan to address problem: 


IV fluids for now








(3) Peritonitis


Current Visit: Yes   Status: Acute   


Plan to address problem: 


Secondary to perforation


IV Zosyn for now








(4) DVT prophylaxis


Current Visit: Yes   Status: Acute   


Plan to address problem: 


On SCDs and GI prophylaxis





7/28


-Status post patch worsening of the perforated gastric ulcer and omentoplasty by

Dr. Manley


-Continue IV antibiotics for now


-We will follow Dr. Manley's recommendation


-I ordered IV PPI


-Cocaine use; counseled about cessation of using cocaine





7/29


-Continue Zosyn


-Follow recommendation from general surgery


-Continue with IV PPI





History


Interval history: 


Patient was seen and evaluated this morning


Patient said he has abdominal pain








Hospitalist Physical





- Physical exam


Narrative exam: 





 Not in cardiopulmonary distress. 


 The patient appeared well nourished and normally developed.


 Vital signs as documented.


 Head exam is unremarkable.


 No scleral icterus .


 Neck is without jugular venous distension, thyromegaly, or carotid bruits. 


 Lungs are clear to auscultation.


Cardiac exam reveals regular rate and  Rhythm. 


Abdominal exam reveals moderate tenderness.


Extremities are nonedematous and both femoral and pedal pulses are normal.


CNS: Alert and oriented 3.  No focal weakness.





- Constitutional


Vitals: 


                                        











Temp Pulse Resp BP Pulse Ox


 


 98.1 F   91 H  16   106/70   95 


 


 07/29/21 07:28  07/29/21 07:28  07/29/21 07:28  07/29/21 07:28  07/29/21 07:28











General appearance: Present: no acute distress, well-nourished





Results





- Labs


CBC & Chem 7: 


                                 07/29/21 04:35





                                 07/29/21 04:35


Labs: 


                             Laboratory Last Values











WBC  7.2 K/mm3 (4.5-11.0)   07/29/21  04:35    


 


RBC  4.45 M/mm3 (3.65-5.03)   07/29/21  04:35    


 


Hgb  13.9 gm/dl (11.8-15.2)   07/29/21  04:35    


 


Hct  41.0 % (35.5-45.6)   07/29/21  04:35    


 


MCV  92 fl (84-94)   07/29/21  04:35    


 


MCH  31 pg (28-32)   07/29/21  04:35    


 


MCHC  34 % (32-34)   07/29/21  04:35    


 


RDW  16.5 % (13.2-15.2)  H  07/29/21  04:35    


 


Plt Count  235 K/mm3 (140-440)   07/29/21  04:35    


 


Lymph % (Auto)  9.2 % (13.4-35.0)  L  07/29/21  04:35    


 


Mono % (Auto)  9.7 % (0.0-7.3)  H  07/29/21  04:35    


 


Eos % (Auto)  0.1 % (0.0-4.3)   07/29/21  04:35    


 


Baso % (Auto)  0.1 % (0.0-1.8)   07/29/21  04:35    


 


Lymph # (Auto)  0.7 K/mm3 (1.2-5.4)  L  07/29/21  04:35    


 


Mono # (Auto)  0.7 K/mm3 (0.0-0.8)   07/29/21  04:35    


 


Eos # (Auto)  0.0 K/mm3 (0.0-0.4)   07/29/21  04:35    


 


Baso # (Auto)  0.0 K/mm3 (0.0-0.1)   07/29/21  04:35    


 


Add Manual Diff  Complete   07/27/21  09:44    


 


Total Counted  100   07/27/21  09:44    


 


Seg Neutrophils %  80.9 % (40.0-70.0)  H  07/29/21  04:35    


 


Seg Neuts % (Manual)  75.0 % (40.0-70.0)  H  07/27/21  09:44    


 


Band Neutrophils %  14.0 %  07/27/21  09:44    


 


Lymphocytes % (Manual)  9.0 % (13.4-35.0)  L  07/27/21  09:44    


 


Monocytes % (Manual)  2.0 % (0.0-7.3)   07/27/21  09:44    


 


Nucleated RBC %  Not Reportable   07/27/21  09:44    


 


Seg Neutrophils #  5.9 K/mm3 (1.8-7.7)   07/29/21  04:35    


 


Seg Neutrophils # Man  5.3 K/mm3 (1.8-7.7)   07/27/21  09:44    


 


Band Neutrophils #  1.0 K/mm3  07/27/21  09:44    


 


Lymphocytes # (Manual)  0.6 K/mm3 (1.2-5.4)  L  07/27/21  09:44    


 


Abs React Lymphs (Man)  0.0 K/mm3  07/27/21  09:44    


 


Monocytes # (Manual)  0.1 K/mm3 (0.0-0.8)   07/27/21  09:44    


 


Eosinophils # (Manual)  0.0 K/mm3 (0.0-0.4)   07/27/21  09:44    


 


Basophils # (Manual)  0.0 K/mm3 (0.0-0.1)   07/27/21  09:44    


 


Metamyelocytes #  0.0 K/mm3  07/27/21  09:44    


 


Myelocytes #  0.0 K/mm3  07/27/21  09:44    


 


Promyelocytes #  0.0 K/mm3  07/27/21  09:44    


 


Blast Cells #  0.0 K/mm3  07/27/21  09:44    


 


WBC Morphology  Not Reportable   07/27/21  09:44    


 


Hypersegmented Neuts  Not Reportable   07/27/21  09:44    


 


Hyposegmented Neuts  Not Reportable   07/27/21  09:44    


 


Hypogranular Neuts  Not Reportable   07/27/21  09:44    


 


Smudge Cells  Not Reportable   07/27/21  09:44    


 


Toxic Granulation  Not Reportable   07/27/21  09:44    


 


Toxic Vacuolation  Not Reportable   07/27/21  09:44    


 


Dohle Bodies  Not Reportable   07/27/21  09:44    


 


Pelger-Huet Anomaly  Not Reportable   07/27/21  09:44    


 


Kerline Rods  Not Reportable   07/27/21  09:44    


 


Platelet Estimate  Consistent w auto   07/27/21  09:44    


 


Clumped Platelets  Not Reportable   07/27/21  09:44    


 


Plt Clumps, EDTA  Not Reportable   07/27/21  09:44    


 


Large Platelets  Not Reportable   07/27/21  09:44    


 


Giant Platelets  Not Reportable   07/27/21  09:44    


 


Platelet Satelliting  Not Reportable   07/27/21  09:44    


 


Plt Morphology Comment  Not Reportable   07/27/21  09:44    


 


RBC Morphology  Normal   07/27/21  09:44    


 


Dimorphic RBCs  Not Reportable   07/27/21  09:44    


 


Polychromasia  Not Reportable   07/27/21  09:44    


 


Hypochromasia  Not Reportable   07/27/21  09:44    


 


Poikilocytosis  Not Reportable   07/27/21  09:44    


 


Anisocytosis  Not Reportable   07/27/21  09:44    


 


Microcytosis  Not Reportable   07/27/21  09:44    


 


Macrocytosis  Not Reportable   07/27/21  09:44    


 


Spherocytes  Not Reportable   07/27/21  09:44    


 


Pappenheimer Bodies  Not Reportable   07/27/21  09:44    


 


Sickle Cells  Not Reportable   07/27/21  09:44    


 


Target Cells  Not Reportable   07/27/21  09:44    


 


Tear Drop Cells  Not Reportable   07/27/21  09:44    


 


Ovalocytes  Not Reportable   07/27/21  09:44    


 


Helmet Cells  Not Reportable   07/27/21  09:44    


 


[place] Bodies  Not Reportable   07/27/21  09:44    


 


Cabot Rings  Not Reportable   07/27/21  09:44    


 


[place] Cells  Not Reportable   07/27/21  09:44    


 


Bite Cells  Not Reportable   07/27/21  09:44    


 


Crenated Cell  Not Reportable   07/27/21  09:44    


 


Elliptocytes  Not Reportable   07/27/21  09:44    


 


Acanthocytes (Spur)  Not Reportable   07/27/21  09:44    


 


Rouleaux  Not Reportable   07/27/21  09:44    


 


Hemoglobin C Crystals  Not Reportable   07/27/21  09:44    


 


Schistocytes  Not Reportable   07/27/21  09:44    


 


Malaria parasites  Not Reportable   07/27/21  09:44    


 


Garo Bodies  Not Reportable   07/27/21  09:44    


 


Hem Pathologist Commnt  No   07/27/21  09:44    


 


Sodium  139 mmol/L (137-145)   07/29/21  04:35    


 


Potassium  3.8 mmol/L (3.6-5.0)   07/29/21  04:35    


 


Chloride  103.7 mmol/L ()   07/29/21  04:35    


 


Carbon Dioxide  26 mmol/L (22-30)   07/29/21  04:35    


 


Anion Gap  13 mmol/L  07/29/21  04:35    


 


BUN  12 mg/dL (9-20)   07/29/21  04:35    


 


Creatinine  1.0 mg/dL (0.8-1.3)   07/29/21  04:35    


 


Estimated GFR  > 60 ml/min  07/29/21  04:35    


 


BUN/Creatinine Ratio  12 %  07/29/21  04:35    


 


Glucose  95 mg/dL ()   07/29/21  04:35    


 


Hemoglobin A1c  5.8 % (4-6)   07/28/21  06:41    


 


Calcium  8.4 mg/dL (8.4-10.2)   07/29/21  04:35    


 


Total Bilirubin  0.60 mg/dL (0.1-1.2)   07/27/21  09:44    


 


Direct Bilirubin  < 0.2 mg/dL (0-0.2)   07/27/21  09:44    


 


Indirect Bilirubin  0.4 mg/dL  07/27/21  09:44    


 


AST  40 units/L (5-40)   07/27/21  09:44    


 


ALT  21 units/L (7-56)   07/27/21  09:44    


 


Alkaline Phosphatase  56 units/L ()   07/27/21  09:44    


 


Total Protein  6.8 g/dL (6.3-8.2)   07/27/21  09:44    


 


Albumin  4.4 g/dL (3.9-5)   07/27/21  09:44    


 


Albumin/Globulin Ratio  1.8 %  07/27/21  09:44    


 


Lipase  99 units/L (13-60)  H  07/27/21  09:44    


 


Urine Color  Yellow  (Yellow)   07/27/21  Unknown


 


Urine Turbidity  Clear  (Clear)   07/27/21  Unknown


 


Urine pH  5.0  (5.0-7.0)   07/27/21  Unknown


 


Ur Specific Gravity  1.035  (1.003-1.030)  H  07/27/21  Unknown


 


Urine Protein  <15 mg/dl mg/dL (Negative)   07/27/21  Unknown


 


Urine Glucose (UA)  Neg mg/dL (Negative)   07/27/21  Unknown


 


Urine Ketones  80 mg/dL (Negative)   07/27/21  Unknown


 


Urine Blood  Neg  (Negative)   07/27/21  Unknown


 


Urine Nitrite  Neg  (Negative)   07/27/21  Unknown


 


Urine Bilirubin  Neg  (Negative)   07/27/21  Unknown


 


Urine Urobilinogen  < 2.0 mg/dL (<2.0)   07/27/21  Unknown


 


Ur Leukocyte Esterase  Neg  (Negative)   07/27/21  Unknown


 


Urine WBC (Auto)  < 1.0 /HPF (0.0-6.0)   07/27/21  Unknown


 


Urine RBC (Auto)  < 1.0 /HPF (0.0-6.0)   07/27/21  Unknown


 


U Epithel Cells (Auto)  < 1.0 /HPF (0-13.0)   07/27/21  Unknown


 


Uric Acid Crystals  Few   07/27/21  Unknown


 


Urine Mucus  Few /HPF  07/27/21  Unknown


 


Urine Opiates Screen  Negative   07/27/21  Unknown


 


Urine Methadone Screen  Negative   07/27/21  Unknown


 


Ur Barbiturates Screen  Negative   07/27/21  Unknown


 


Ur Phencyclidine Scrn  Negative   07/27/21  Unknown


 


Ur Amphetamines Screen  Negative   07/27/21  Unknown


 


U Benzodiazepines Scrn  Negative   07/27/21  Unknown


 


Urine Cocaine Screen  Positive   07/27/21  Unknown


 


U Marijuana (THC) Screen  Negative   07/27/21  Unknown


 


Drugs of Abuse Note  Disclamer   07/27/21  Unknown











[place]/IV: 


                                        





Voiding Method                   Indwelling Catheter











Active Medications





- Current Medications


Current Medications: 














Generic Name Dose Route Start Last Admin





  Trade Name Freq  PRN Reason Stop Dose Admin


 


Acetaminophen  650 mg  07/27/21 21:17 





  Acetaminophen 325 Mg Tab  PO  





  Q4H PRN  





  Pain MILD(1-3)/Fever >100.5/HA  


 


Hydromorphone HCl  0.5 mg  07/27/21 21:17  07/29/21 03:13





  Hydromorphone 1 Mg/1 Ml Inj  IV   0.5 mg





  Q3H PRN   Administration





  Pain , Severe (7-10)  


 


Dextrose/Sodium Chloride  1,000 mls @ 75 mls/hr  07/27/21 22:00  07/28/21 22:40





  D5ns  IV   75 mls/hr





  AS DIRECT LG   Administration


 


Piperacillin Sod/Tazobactam Sod  4.5 gm in 100 mls @ 200 mls/hr  07/28/21 14:00 

07/29/21 05:34





  Zosyn/Ns 4.5gm/100ml  IV   200 mls/hr





  Q8HR LG   Administration


 


Metoclopramide HCl  10 mg  07/27/21 21:17 





  Metoclopramide 10 Mg/2 Ml Inj  IV  





  Q6H PRN  





  Nausea And Vomiting  


 


Morphine Sulfate  2 mg  07/27/21 21:17  07/28/21 19:35





  Morphine 2 Mg/1 Ml Inj  IV   2 mg





  Q4H PRN   Administration





  Pain, Moderate (4-6)  


 


Ondansetron HCl  4 mg  07/27/21 21:17 





  Ondansetron 4 Mg/2 Ml Inj  IV  





  Q3H PRN  





  Nausea And Vomiting  


 


Pantoprazole Sodium  40 mg  07/27/21 22:00  07/28/21 22:34





  Pantoprazole 40 Mg Inj  IV   40 mg





  BID LG   Administration


 


Sodium Chloride  10 ml  07/27/21 22:00  07/28/21 22:35





  Sodium Chloride 0.9% 10 Ml Flush Syringe  IV   10 ml





  BID LG   Administration


 


Sodium Chloride  10 ml  07/27/21 21:17 





  Sodium Chloride 0.9% 10 Ml Flush Syringe  IV  





  PRN PRN  





  LINE FLUSH

## 2021-07-30 LAB
BASOPHILS # (AUTO): 0 K/MM3 (ref 0–0.1)
BASOPHILS NFR BLD AUTO: 0.4 % (ref 0–1.8)
BUN SERPL-MCNC: 8 MG/DL (ref 9–20)
BUN/CREAT SERPL: 9 %
CALCIUM SERPL-MCNC: 8.4 MG/DL (ref 8.4–10.2)
EOSINOPHIL # BLD AUTO: 0 K/MM3 (ref 0–0.4)
EOSINOPHIL NFR BLD AUTO: 0.1 % (ref 0–4.3)
HCT VFR BLD CALC: 37.9 % (ref 35.5–45.6)
HEMOLYSIS INDEX: 3
HGB BLD-MCNC: 13.5 GM/DL (ref 11.8–15.2)
LYMPHOCYTES # BLD AUTO: 0.7 K/MM3 (ref 1.2–5.4)
LYMPHOCYTES NFR BLD AUTO: 8.4 % (ref 13.4–35)
MCHC RBC AUTO-ENTMCNC: 36 % (ref 32–34)
MCV RBC AUTO: 90 FL (ref 84–94)
MONOCYTES # (AUTO): 0.6 K/MM3 (ref 0–0.8)
MONOCYTES % (AUTO): 7.2 % (ref 0–7.3)
PLATELET # BLD: 250 K/MM3 (ref 140–440)
RBC # BLD AUTO: 4.22 M/MM3 (ref 3.65–5.03)

## 2021-07-30 RX ADMIN — PIPERACILLIN AND TAZOBACTAM SCH MLS/HR: 4; .5 INJECTION, POWDER, FOR SOLUTION INTRAVENOUS at 22:41

## 2021-07-30 RX ADMIN — Medication SCH ML: at 10:01

## 2021-07-30 RX ADMIN — Medication SCH APPLICATIO: at 13:06

## 2021-07-30 RX ADMIN — PIPERACILLIN AND TAZOBACTAM SCH MLS/HR: 4; .5 INJECTION, POWDER, FOR SOLUTION INTRAVENOUS at 06:20

## 2021-07-30 RX ADMIN — PANTOPRAZOLE SODIUM SCH MG: 40 INJECTION, POWDER, FOR SOLUTION INTRAVENOUS at 22:41

## 2021-07-30 RX ADMIN — Medication SCH: at 20:58

## 2021-07-30 RX ADMIN — PANTOPRAZOLE SODIUM SCH MG: 40 INJECTION, POWDER, FOR SOLUTION INTRAVENOUS at 10:59

## 2021-07-30 RX ADMIN — Medication SCH ML: at 22:42

## 2021-07-30 RX ADMIN — Medication SCH APPLICATIO: at 06:37

## 2021-07-30 RX ADMIN — HYDROMORPHONE HYDROCHLORIDE PRN MG: 1 INJECTION, SOLUTION INTRAMUSCULAR; INTRAVENOUS; SUBCUTANEOUS at 22:41

## 2021-07-30 RX ADMIN — MORPHINE SULFATE PRN MG: 2 INJECTION, SOLUTION INTRAMUSCULAR; INTRAVENOUS at 20:02

## 2021-07-30 RX ADMIN — Medication SCH ML: at 06:38

## 2021-07-30 RX ADMIN — PIPERACILLIN AND TAZOBACTAM SCH MLS/HR: 4; .5 INJECTION, POWDER, FOR SOLUTION INTRAVENOUS at 12:59

## 2021-07-30 RX ADMIN — MORPHINE SULFATE PRN MG: 2 INJECTION, SOLUTION INTRAMUSCULAR; INTRAVENOUS at 06:41

## 2021-07-30 NOTE — XRAY REPORT
ABDOMEN 2 VIEW(S)



INDICATION / CLINICAL INFORMATION:  ileus.



COMPARISON:  None available.



FINDINGS:



TUBES / LINES: Nasogastric tube terminates in the mid to distal stomach.

BOWEL GAS PATTERN: The bowel gas pattern is unremarkable. No significant findings to suggest obstruct
ion or ileus. 

FREE AIR / EXTRALUMINAL GAS: Small free air is identified beneath the right hemidiaphragm although it
 is decreased since 7/27/2021 exam.



ADDITIONAL FINDINGS: No significant additional findings.



IMPRESSION:

No evidence for obstruction or significant ileus on x-ray.



Signer Name: Mor Dale Jr, MD 

Signed: 7/30/2021 9:23 AM

Workstation Name: [id number]

## 2021-07-30 NOTE — PROGRESS NOTE
Assessment and Plan





41-year-old male postop day 4 status post ex lap with Davian patch repair of 

perforated gastric ulcer.  Clinically with postop ileus and remains afebrile and

stable.  We will continue NG tube to suction and wait for return of bowel 

function.  Will order upper GI to test integrity of repair prior to removal of 

NG tube.





Subjective


Date of service: 07/30/21


Narrative: 





No acute events overnight.  Patient says overall he is feeling better with 

improved abdominal pain.  Denies passing any flatus.





Objective


                               Vital Signs - 12hr











  07/30/21 07/30/21 07/30/21





  05:20 06:41 07:11


 


Temperature 98.8 F  


 


Pulse Rate 97 H  


 


Respiratory 18 17 17





Rate   


 


Blood Pressure 132/92  


 


O2 Sat by Pulse 100  





Oximetry   














  07/30/21 07/30/21





  08:01 11:19


 


Temperature 99.0 F 98.5 F


 


Pulse Rate 86 81


 


Respiratory 18 18





Rate  


 


Blood Pressure 134/86 129/94


 


O2 Sat by Pulse 98 98





Oximetry  














- General physical appearance


well developed, no distress, no pain





- Respiratory


normal expansion, normal respiratory effort





- Abdomen


soft, other (Bandages clean dry and intact, appropriately tender to palpation, 

NG tube with 500 cc bilious fluid past 24 hours)





- Labs





                                 07/30/21 08:30





                                 07/30/21 08:30


                                 Diabetes panel











  07/30/21 Range/Units





  08:30 


 


Sodium  141  (137-145)  mmol/L


 


Potassium  3.6  (3.6-5.0)  mmol/L


 


Chloride  106.4  ()  mmol/L


 


Carbon Dioxide  23  (22-30)  mmol/L


 


BUN  8 L  (9-20)  mg/dL


 


Creatinine  0.9  (0.8-1.3)  mg/dL


 


Glucose  116 H  ()  mg/dL


 


Calcium  8.4  (8.4-10.2)  mg/dL








                                  Calcium panel











  07/30/21 Range/Units





  08:30 


 


Calcium  8.4  (8.4-10.2)  mg/dL








                                 Pituitary panel











  07/30/21 Range/Units





  08:30 


 


Sodium  141  (137-145)  mmol/L


 


Potassium  3.6  (3.6-5.0)  mmol/L


 


Chloride  106.4  ()  mmol/L


 


Carbon Dioxide  23  (22-30)  mmol/L


 


BUN  8 L  (9-20)  mg/dL


 


Creatinine  0.9  (0.8-1.3)  mg/dL


 


Glucose  116 H  ()  mg/dL


 


Calcium  8.4  (8.4-10.2)  mg/dL








                                  Adrenal panel











  07/30/21 Range/Units





  08:30 


 


Sodium  141  (137-145)  mmol/L


 


Potassium  3.6  (3.6-5.0)  mmol/L


 


Chloride  106.4  ()  mmol/L


 


Carbon Dioxide  23  (22-30)  mmol/L


 


BUN  8 L  (9-20)  mg/dL


 


Creatinine  0.9  (0.8-1.3)  mg/dL


 


Glucose  116 H  ()  mg/dL


 


Calcium  8.4  (8.4-10.2)  mg/dL

## 2021-07-30 NOTE — PROGRESS NOTE
Assessment and Plan


Assessment and plan: 





(1) Perforated abdominal viscus


Current Visit: Yes   Status: Acute   


Plan to address problem: 


Patient was taken for emergent abdominal surgery for repair of perforated viscus


Perforated viscus may be in the duodenum or the lower esophagus


Dr. Manley consulted


N.p.o. for now


IVF Zosyn for now








(2) Dehydration


Current Visit: Yes   Status: Acute   


Plan to address problem: 


IV fluids for now








(3) Peritonitis


Current Visit: Yes   Status: Acute   


Plan to address problem: 


Secondary to perforation


IV Zosyn for now








(4) DVT prophylaxis


Current Visit: Yes   Status: Acute   


Plan to address problem: 


On SCDs and GI prophylaxis





7/28


-Status post patch worsening of the perforated gastric ulcer and omentoplasty by

Dr. Manley


-Continue IV antibiotics for now


-We will follow Dr. Manley's recommendation


-I ordered IV PPI


-Cocaine use; counseled about cessation of using cocaine





7/29


-Continue Zosyn


-Follow recommendation from general surgery


-Continue with IV PPI





7/30


-Continue Zosyn and PPI


-Abdominal x-ray was done this morning and showed no significant intestinal 

obstruction or ileus


-Patient is on NG tube and general surgery is following





History


Interval history: 


Patient was seen and evaluated this morning


Patient said he has abdominal pain








Hospitalist Physical





- Physical exam


Narrative exam: 





 Not in cardiopulmonary distress. 


 The patient appeared well nourished and normally developed.


 Vital signs as documented.


 Head exam is unremarkable.


 No scleral icterus .


 Neck is without jugular venous distension, thyromegaly, or carotid bruits. 


 Lungs are clear to auscultation.


Cardiac exam reveals regular rate and  Rhythm. 


Abdominal exam reveals moderate tenderness.


Extremities are nonedematous and both femoral and pedal pulses are normal.


CNS: Alert and oriented 3.  No focal weakness.





- Constitutional


Vitals: 


                                        











Temp Pulse Resp BP Pulse Ox


 


 99.0 F   86   18   134/86   98 


 


 07/30/21 08:01  07/30/21 08:01  07/30/21 08:01  07/30/21 08:01  07/30/21 08:01











General appearance: Present: no acute distress, well-nourished





Results





- Labs


CBC & Chem 7: 


                                 07/30/21 08:30





                                 07/30/21 08:30


Labs: 


                             Laboratory Last Values











WBC  8.0 K/mm3 (4.5-11.0)   07/30/21  08:30    


 


RBC  4.22 M/mm3 (3.65-5.03)   07/30/21  08:30    


 


Hgb  13.5 gm/dl (11.8-15.2)   07/30/21  08:30    


 


Hct  37.9 % (35.5-45.6)   07/30/21  08:30    


 


MCV  90 fl (84-94)   07/30/21  08:30    


 


MCH  32 pg (28-32)   07/30/21  08:30    


 


MCHC  36 % (32-34)  H  07/30/21  08:30    


 


RDW  16.2 % (13.2-15.2)  H  07/30/21  08:30    


 


Plt Count  250 K/mm3 (140-440)   07/30/21  08:30    


 


Lymph % (Auto)  8.4 % (13.4-35.0)  L  07/30/21  08:30    


 


Mono % (Auto)  7.2 % (0.0-7.3)   07/30/21  08:30    


 


Eos % (Auto)  0.1 % (0.0-4.3)   07/30/21  08:30    


 


Baso % (Auto)  0.4 % (0.0-1.8)   07/30/21  08:30    


 


Lymph # (Auto)  0.7 K/mm3 (1.2-5.4)  L  07/30/21  08:30    


 


Mono # (Auto)  0.6 K/mm3 (0.0-0.8)   07/30/21  08:30    


 


Eos # (Auto)  0.0 K/mm3 (0.0-0.4)   07/30/21  08:30    


 


Baso # (Auto)  0.0 K/mm3 (0.0-0.1)   07/30/21  08:30    


 


Add Manual Diff  Complete   07/27/21  09:44    


 


Total Counted  100   07/27/21  09:44    


 


Seg Neutrophils %  83.9 % (40.0-70.0)  H  07/30/21  08:30    


 


Seg Neuts % (Manual)  75.0 % (40.0-70.0)  H  07/27/21  09:44    


 


Band Neutrophils %  14.0 %  07/27/21  09:44    


 


Lymphocytes % (Manual)  9.0 % (13.4-35.0)  L  07/27/21  09:44    


 


Monocytes % (Manual)  2.0 % (0.0-7.3)   07/27/21  09:44    


 


Nucleated RBC %  Not Reportable   07/27/21  09:44    


 


Seg Neutrophils #  6.7 K/mm3 (1.8-7.7)   07/30/21  08:30    


 


Seg Neutrophils # Man  5.3 K/mm3 (1.8-7.7)   07/27/21  09:44    


 


Band Neutrophils #  1.0 K/mm3  07/27/21  09:44    


 


Lymphocytes # (Manual)  0.6 K/mm3 (1.2-5.4)  L  07/27/21  09:44    


 


Abs React Lymphs (Man)  0.0 K/mm3  07/27/21  09:44    


 


Monocytes # (Manual)  0.1 K/mm3 (0.0-0.8)   07/27/21  09:44    


 


Eosinophils # (Manual)  0.0 K/mm3 (0.0-0.4)   07/27/21  09:44    


 


Basophils # (Manual)  0.0 K/mm3 (0.0-0.1)   07/27/21  09:44    


 


Metamyelocytes #  0.0 K/mm3  07/27/21  09:44    


 


Myelocytes #  0.0 K/mm3  07/27/21  09:44    


 


Promyelocytes #  0.0 K/mm3  07/27/21  09:44    


 


Blast Cells #  0.0 K/mm3  07/27/21  09:44    


 


WBC Morphology  Not Reportable   07/27/21  09:44    


 


Hypersegmented Neuts  Not Reportable   07/27/21  09:44    


 


Hyposegmented Neuts  Not Reportable   07/27/21  09:44    


 


Hypogranular Neuts  Not Reportable   07/27/21  09:44    


 


Smudge Cells  Not Reportable   07/27/21  09:44    


 


Toxic Granulation  Not Reportable   07/27/21  09:44    


 


Toxic Vacuolation  Not Reportable   07/27/21  09:44    


 


Dohle Bodies  Not Reportable   07/27/21  09:44    


 


Pelger-Huet Anomaly  Not Reportable   07/27/21  09:44    


 


Kerline Rods  Not Reportable   07/27/21  09:44    


 


Platelet Estimate  Consistent w auto   07/27/21  09:44    


 


Clumped Platelets  Not Reportable   07/27/21  09:44    


 


Plt Clumps, EDTA  Not Reportable   07/27/21  09:44    


 


Large Platelets  Not Reportable   07/27/21  09:44    


 


Giant Platelets  Not Reportable   07/27/21  09:44    


 


Platelet Satelliting  Not Reportable   07/27/21  09:44    


 


Plt Morphology Comment  Not Reportable   07/27/21  09:44    


 


RBC Morphology  Normal   07/27/21  09:44    


 


Dimorphic RBCs  Not Reportable   07/27/21  09:44    


 


Polychromasia  Not Reportable   07/27/21  09:44    


 


Hypochromasia  Not Reportable   07/27/21  09:44    


 


Poikilocytosis  Not Reportable   07/27/21  09:44    


 


Anisocytosis  Not Reportable   07/27/21  09:44    


 


Microcytosis  Not Reportable   07/27/21  09:44    


 


Macrocytosis  Not Reportable   07/27/21  09:44    


 


Spherocytes  Not Reportable   07/27/21  09:44    


 


Pappenheimer Bodies  Not Reportable   07/27/21  09:44    


 


Sickle Cells  Not Reportable   07/27/21  09:44    


 


Target Cells  Not Reportable   07/27/21  09:44    


 


Tear Drop Cells  Not Reportable   07/27/21  09:44    


 


Ovalocytes  Not Reportable   07/27/21  09:44    


 


Helmet Cells  Not Reportable   07/27/21  09:44    


 


[place] Bodies  Not Reportable   07/27/21  09:44    


 


Cabot Rings  Not Reportable   07/27/21  09:44    


 


[place] Cells  Not Reportable   07/27/21  09:44    


 


Bite Cells  Not Reportable   07/27/21  09:44    


 


Crenated Cell  Not Reportable   07/27/21  09:44    


 


Elliptocytes  Not Reportable   07/27/21  09:44    


 


Acanthocytes (Spur)  Not Reportable   07/27/21  09:44    


 


Rouleaux  Not Reportable   07/27/21  09:44    


 


Hemoglobin C Crystals  Not Reportable   07/27/21  09:44    


 


Schistocytes  Not Reportable   07/27/21  09:44    


 


Malaria parasites  Not Reportable   07/27/21  09:44    


 


Garo Bodies  Not Reportable   07/27/21  09:44    


 


Hem Pathologist Commnt  No   07/27/21  09:44    


 


Sodium  141 mmol/L (137-145)   07/30/21  08:30    


 


Potassium  3.6 mmol/L (3.6-5.0)   07/30/21  08:30    


 


Chloride  106.4 mmol/L ()   07/30/21  08:30    


 


Carbon Dioxide  23 mmol/L (22-30)   07/30/21  08:30    


 


Anion Gap  15 mmol/L  07/30/21  08:30    


 


BUN  8 mg/dL (9-20)  L  07/30/21  08:30    


 


Creatinine  0.9 mg/dL (0.8-1.3)   07/30/21  08:30    


 


Estimated GFR  > 60 ml/min  07/30/21  08:30    


 


BUN/Creatinine Ratio  9 %  07/30/21  08:30    


 


Glucose  116 mg/dL ()  H  07/30/21  08:30    


 


Hemoglobin A1c  5.8 % (4-6)   07/28/21  06:41    


 


Calcium  8.4 mg/dL (8.4-10.2)   07/30/21  08:30    


 


Total Bilirubin  0.60 mg/dL (0.1-1.2)   07/27/21  09:44    


 


Direct Bilirubin  < 0.2 mg/dL (0-0.2)   07/27/21  09:44    


 


Indirect Bilirubin  0.4 mg/dL  07/27/21  09:44    


 


AST  40 units/L (5-40)   07/27/21  09:44    


 


ALT  21 units/L (7-56)   07/27/21  09:44    


 


Alkaline Phosphatase  56 units/L ()   07/27/21  09:44    


 


Total Protein  6.8 g/dL (6.3-8.2)   07/27/21  09:44    


 


Albumin  4.4 g/dL (3.9-5)   07/27/21  09:44    


 


Albumin/Globulin Ratio  1.8 %  07/27/21  09:44    


 


Lipase  99 units/L (13-60)  H  07/27/21  09:44    


 


Urine Color  Yellow  (Yellow)   07/27/21  Unknown


 


Urine Turbidity  Clear  (Clear)   07/27/21  Unknown


 


Urine pH  5.0  (5.0-7.0)   07/27/21  Unknown


 


Ur Specific Gravity  1.035  (1.003-1.030)  H  07/27/21  Unknown


 


Urine Protein  <15 mg/dl mg/dL (Negative)   07/27/21  Unknown


 


Urine Glucose (UA)  Neg mg/dL (Negative)   07/27/21  Unknown


 


Urine Ketones  80 mg/dL (Negative)   07/27/21  Unknown


 


Urine Blood  Neg  (Negative)   07/27/21  Unknown


 


Urine Nitrite  Neg  (Negative)   07/27/21  Unknown


 


Urine Bilirubin  Neg  (Negative)   07/27/21  Unknown


 


Urine Urobilinogen  < 2.0 mg/dL (<2.0)   07/27/21  Unknown


 


Ur Leukocyte Esterase  Neg  (Negative)   07/27/21  Unknown


 


Urine WBC (Auto)  < 1.0 /HPF (0.0-6.0)   07/27/21  Unknown


 


Urine RBC (Auto)  < 1.0 /HPF (0.0-6.0)   07/27/21  Unknown


 


U Epithel Cells (Auto)  < 1.0 /HPF (0-13.0)   07/27/21  Unknown


 


Uric Acid Crystals  Few   07/27/21  Unknown


 


Urine Mucus  Few /HPF  07/27/21  Unknown


 


Urine Opiates Screen  Negative   07/27/21  Unknown


 


Urine Methadone Screen  Negative   07/27/21  Unknown


 


Ur Barbiturates Screen  Negative   07/27/21  Unknown


 


Ur Phencyclidine Scrn  Negative   07/27/21  Unknown


 


Ur Amphetamines Screen  Negative   07/27/21  Unknown


 


U Benzodiazepines Scrn  Negative   07/27/21  Unknown


 


Urine Cocaine Screen  Positive   07/27/21  Unknown


 


U Marijuana (THC) Screen  Negative   07/27/21  Unknown


 


Drugs of Abuse Note  Disclamer   07/27/21  Unknown











Microbiology: 


Microbiology





07/27/21 Unknown   Abdomen   Anaerobic Culture - Preliminary








[place]/IV: 


                                        





Voiding Method                   Urinal











Active Medications





- Current Medications


Current Medications: 














Generic Name Dose Route Start Last Admin





  Trade Name Freq  PRN Reason Stop Dose Admin


 


Acetaminophen  650 mg  07/27/21 21:17 





  Acetaminophen 325 Mg Tab  PO  





  Q4H PRN  





  Pain MILD(1-3)/Fever >100.5/HA  


 


Hydromorphone HCl  0.5 mg  07/27/21 21:17  07/29/21 03:13





  Hydromorphone 1 Mg/1 Ml Inj  IV   0.5 mg





  Q3H PRN   Administration





  Pain , Severe (7-10)  


 


Dextrose/Sodium Chloride  1,000 mls @ 75 mls/hr  07/27/21 22:00  07/29/21 11:56





  D5ns  IV   75 mls/hr





  AS DIRECT LG   Administration


 


Piperacillin Sod/Tazobactam Sod  4.5 gm in 100 mls @ 200 mls/hr  07/28/21 14:00 

07/30/21 06:20





  Zosyn/Ns 4.5gm/100ml  IV   200 mls/hr





  Q8HR LG   Administration


 


Metoclopramide HCl  10 mg  07/27/21 21:17 





  Metoclopramide 10 Mg/2 Ml Inj  IV  





  Q6H PRN  





  Nausea And Vomiting  


 


Morphine Sulfate  2 mg  07/27/21 21:17  07/30/21 06:41





  Morphine 2 Mg/1 Ml Inj  IV   2 mg





  Q4H PRN   Administration





  Pain, Moderate (4-6)  


 


Ondansetron HCl  4 mg  07/27/21 21:17 





  Ondansetron 4 Mg/2 Ml Inj  IV  





  Q3H PRN  





  Nausea And Vomiting  


 


Pantoprazole Sodium  40 mg  07/27/21 22:00  07/29/21 21:50





  Pantoprazole 40 Mg Inj  IV   40 mg





  BID LG   Administration


 


Sodium Chloride  10 ml  07/27/21 22:00  07/30/21 06:38





  Sodium Chloride 0.9% 10 Ml Flush Syringe  IV   10 ml





  BID LG   Administration


 


Sodium Chloride  10 ml  07/27/21 21:17 





  Sodium Chloride 0.9% 10 Ml Flush Syringe  IV  





  PRN PRN  





  LINE FLUSH  


 


Sodium Hypochlorite  1 applic  07/29/21 14:00  07/30/21 06:37





  Sodium Hypochlorite, Dakin's Full Strength (0.5%) 473 Ml Topical Soln  TP  

08/12/21 13:59  1 applicatio





  Q12H LG   Administration

## 2021-07-31 RX ADMIN — DEXTROSE AND SODIUM CHLORIDE SCH MLS/HR: 5; .9 INJECTION, SOLUTION INTRAVENOUS at 10:43

## 2021-07-31 RX ADMIN — PIPERACILLIN AND TAZOBACTAM SCH MLS/HR: 4; .5 INJECTION, POWDER, FOR SOLUTION INTRAVENOUS at 21:52

## 2021-07-31 RX ADMIN — Medication SCH: at 07:34

## 2021-07-31 RX ADMIN — HYDROMORPHONE HYDROCHLORIDE PRN MG: 1 INJECTION, SOLUTION INTRAMUSCULAR; INTRAVENOUS; SUBCUTANEOUS at 13:19

## 2021-07-31 RX ADMIN — PANTOPRAZOLE SODIUM SCH MG: 40 INJECTION, POWDER, FOR SOLUTION INTRAVENOUS at 10:43

## 2021-07-31 RX ADMIN — PIPERACILLIN AND TAZOBACTAM SCH MLS/HR: 4; .5 INJECTION, POWDER, FOR SOLUTION INTRAVENOUS at 07:31

## 2021-07-31 RX ADMIN — PIPERACILLIN AND TAZOBACTAM SCH MLS/HR: 4; .5 INJECTION, POWDER, FOR SOLUTION INTRAVENOUS at 13:17

## 2021-07-31 RX ADMIN — PANTOPRAZOLE SODIUM SCH MG: 40 INJECTION, POWDER, FOR SOLUTION INTRAVENOUS at 21:53

## 2021-07-31 RX ADMIN — Medication SCH: at 10:45

## 2021-07-31 RX ADMIN — Medication SCH ML: at 21:53

## 2021-07-31 RX ADMIN — Medication SCH APPLICATIO: at 13:20

## 2021-07-31 NOTE — PROGRESS NOTE
Assessment and Plan





- Patient Problems


(1) Perforated abdominal viscus


Current Visit: Yes   Status: Acute   


Plan to address problem: 


Patient status post surgical intervention.  Continue management as per surgical 

team recommendations.








(2) Peritonitis


Current Visit: Yes   Status: Acute   


Plan to address problem: 


IV antibiotic therapy, supportive care, repeat CBC in a.m.








(3) Dehydration


Current Visit: Yes   Status: Acute   


Plan to address problem: 


IV fluid resuscitation therapy, supportive care, monitor urine output every 

shift.








(4) DVT prophylaxis


Current Visit: Yes   Status: Acute   


Plan to address problem: 


SCD to bilateral lower extremities while in bed








History


Interval history: 


40 YO Male with perforated viscus complicated by peritonitis, volume depletion. 

No reported nursing events.  No reports of pain.  Patient resting comfortably in

bed.  Patient denies fever, chills, chest pain, nausea vomiting diarrhea.








Hospitalist Physical





- Constitutional


Vitals: 


                                        











Temp Pulse Resp BP Pulse Ox


 


 98.6 F   85   18   129/94   98 


 


 07/31/21 08:08  07/31/21 08:08  07/31/21 08:08  07/31/21 08:08  07/31/21 10:00











General appearance: Present: no acute distress, well-nourished





- EENT


Eyes: Present: PERRL, EOM intact


ENT: hearing intact





- Neck


Neck: Present: supple





- Respiratory


Respiratory effort: normal


Respiratory: bilateral: CTA





- Cardiovascular


Rhythm: regular


Heart Sounds: Present: S1 & S2





- Extremities


Extremities: no ischemia


Peripheral Pulses: within normal limits





- Abdominal


General gastrointestinal: soft, non-distended, other (Appropriately tender)





- Integumentary


Integumentary: Present: clear, dry





- Psychiatric


Psychiatric: appropriate mood/affect, cooperative





- Neurologic


Neurologic: CNII-XII intact





Results





- Labs


CBC & Chem 7: 


                                 08/01/21 04:30





                                 07/30/21 08:30


Labs: 


                             Laboratory Last Values











WBC  8.0 K/mm3 (4.5-11.0)   07/30/21  08:30    


 


RBC  4.22 M/mm3 (3.65-5.03)   07/30/21  08:30    


 


Hgb  13.5 gm/dl (11.8-15.2)   07/30/21  08:30    


 


Hct  37.9 % (35.5-45.6)   07/30/21  08:30    


 


MCV  90 fl (84-94)   07/30/21  08:30    


 


MCH  32 pg (28-32)   07/30/21  08:30    


 


MCHC  36 % (32-34)  H  07/30/21  08:30    


 


RDW  16.2 % (13.2-15.2)  H  07/30/21  08:30    


 


Plt Count  250 K/mm3 (140-440)   07/30/21  08:30    


 


Lymph % (Auto)  8.4 % (13.4-35.0)  L  07/30/21  08:30    


 


Mono % (Auto)  7.2 % (0.0-7.3)   07/30/21  08:30    


 


Eos % (Auto)  0.1 % (0.0-4.3)   07/30/21  08:30    


 


Baso % (Auto)  0.4 % (0.0-1.8)   07/30/21  08:30    


 


Lymph # (Auto)  0.7 K/mm3 (1.2-5.4)  L  07/30/21  08:30    


 


Mono # (Auto)  0.6 K/mm3 (0.0-0.8)   07/30/21  08:30    


 


Eos # (Auto)  0.0 K/mm3 (0.0-0.4)   07/30/21  08:30    


 


Baso # (Auto)  0.0 K/mm3 (0.0-0.1)   07/30/21  08:30    


 


Add Manual Diff  Complete   07/27/21  09:44    


 


Total Counted  100   07/27/21  09:44    


 


Seg Neutrophils %  83.9 % (40.0-70.0)  H  07/30/21  08:30    


 


Seg Neuts % (Manual)  75.0 % (40.0-70.0)  H  07/27/21  09:44    


 


Band Neutrophils %  14.0 %  07/27/21  09:44    


 


Lymphocytes % (Manual)  9.0 % (13.4-35.0)  L  07/27/21  09:44    


 


Monocytes % (Manual)  2.0 % (0.0-7.3)   07/27/21  09:44    


 


Nucleated RBC %  Not Reportable   07/27/21  09:44    


 


Seg Neutrophils #  6.7 K/mm3 (1.8-7.7)   07/30/21  08:30    


 


Seg Neutrophils # Man  5.3 K/mm3 (1.8-7.7)   07/27/21  09:44    


 


Band Neutrophils #  1.0 K/mm3  07/27/21  09:44    


 


Lymphocytes # (Manual)  0.6 K/mm3 (1.2-5.4)  L  07/27/21  09:44    


 


Abs React Lymphs (Man)  0.0 K/mm3  07/27/21  09:44    


 


Monocytes # (Manual)  0.1 K/mm3 (0.0-0.8)   07/27/21  09:44    


 


Eosinophils # (Manual)  0.0 K/mm3 (0.0-0.4)   07/27/21  09:44    


 


Basophils # (Manual)  0.0 K/mm3 (0.0-0.1)   07/27/21  09:44    


 


Metamyelocytes #  0.0 K/mm3  07/27/21  09:44    


 


Myelocytes #  0.0 K/mm3  07/27/21  09:44    


 


Promyelocytes #  0.0 K/mm3  07/27/21  09:44    


 


Blast Cells #  0.0 K/mm3  07/27/21  09:44    


 


WBC Morphology  Not Reportable   07/27/21  09:44    


 


Hypersegmented Neuts  Not Reportable   07/27/21  09:44    


 


Hyposegmented Neuts  Not Reportable   07/27/21  09:44    


 


Hypogranular Neuts  Not Reportable   07/27/21  09:44    


 


Smudge Cells  Not Reportable   07/27/21  09:44    


 


Toxic Granulation  Not Reportable   07/27/21  09:44    


 


Toxic Vacuolation  Not Reportable   07/27/21  09:44    


 


Dohle Bodies  Not Reportable   07/27/21  09:44    


 


Pelger-Huet Anomaly  Not Reportable   07/27/21  09:44    


 


Kerline Rods  Not Reportable   07/27/21  09:44    


 


Platelet Estimate  Consistent w auto   07/27/21  09:44    


 


Clumped Platelets  Not Reportable   07/27/21  09:44    


 


Plt Clumps, EDTA  Not Reportable   07/27/21  09:44    


 


Large Platelets  Not Reportable   07/27/21  09:44    


 


Giant Platelets  Not Reportable   07/27/21  09:44    


 


Platelet Satelliting  Not Reportable   07/27/21  09:44    


 


Plt Morphology Comment  Not Reportable   07/27/21  09:44    


 


RBC Morphology  Normal   07/27/21  09:44    


 


Dimorphic RBCs  Not Reportable   07/27/21  09:44    


 


Polychromasia  Not Reportable   07/27/21  09:44    


 


Hypochromasia  Not Reportable   07/27/21  09:44    


 


Poikilocytosis  Not Reportable   07/27/21  09:44    


 


Anisocytosis  Not Reportable   07/27/21  09:44    


 


Microcytosis  Not Reportable   07/27/21  09:44    


 


Macrocytosis  Not Reportable   07/27/21  09:44    


 


Spherocytes  Not Reportable   07/27/21  09:44    


 


Pappenheimer Bodies  Not Reportable   07/27/21  09:44    


 


Sickle Cells  Not Reportable   07/27/21  09:44    


 


Target Cells  Not Reportable   07/27/21  09:44    


 


Tear Drop Cells  Not Reportable   07/27/21  09:44    


 


Ovalocytes  Not Reportable   07/27/21  09:44    


 


Helmet Cells  Not Reportable   07/27/21  09:44    


 


[place] Bodies  Not Reportable   07/27/21  09:44    


 


Cabot Rings  Not Reportable   07/27/21  09:44    


 


[place] Cells  Not Reportable   07/27/21  09:44    


 


Bite Cells  Not Reportable   07/27/21  09:44    


 


Crenated Cell  Not Reportable   07/27/21  09:44    


 


Elliptocytes  Not Reportable   07/27/21  09:44    


 


Acanthocytes (Spur)  Not Reportable   07/27/21  09:44    


 


Rouleaux  Not Reportable   07/27/21  09:44    


 


Hemoglobin C Crystals  Not Reportable   07/27/21  09:44    


 


Schistocytes  Not Reportable   07/27/21  09:44    


 


Malaria parasites  Not Reportable   07/27/21  09:44    


 


Garo Bodies  Not Reportable   07/27/21  09:44    


 


Hem Pathologist Commnt  No   07/27/21  09:44    


 


Sodium  141 mmol/L (137-145)   07/30/21  08:30    


 


Potassium  3.6 mmol/L (3.6-5.0)   07/30/21  08:30    


 


Chloride  106.4 mmol/L ()   07/30/21  08:30    


 


Carbon Dioxide  23 mmol/L (22-30)   07/30/21  08:30    


 


Anion Gap  15 mmol/L  07/30/21  08:30    


 


BUN  8 mg/dL (9-20)  L  07/30/21  08:30    


 


Creatinine  0.9 mg/dL (0.8-1.3)   07/30/21  08:30    


 


Estimated GFR  > 60 ml/min  07/30/21  08:30    


 


BUN/Creatinine Ratio  9 %  07/30/21  08:30    


 


Glucose  116 mg/dL ()  H  07/30/21  08:30    


 


Hemoglobin A1c  5.8 % (4-6)   07/28/21  06:41    


 


Calcium  8.4 mg/dL (8.4-10.2)   07/30/21  08:30    


 


Total Bilirubin  0.60 mg/dL (0.1-1.2)   07/27/21  09:44    


 


Direct Bilirubin  < 0.2 mg/dL (0-0.2)   07/27/21  09:44    


 


Indirect Bilirubin  0.4 mg/dL  07/27/21  09:44    


 


AST  40 units/L (5-40)   07/27/21  09:44    


 


ALT  21 units/L (7-56)   07/27/21  09:44    


 


Alkaline Phosphatase  56 units/L ()   07/27/21  09:44    


 


Total Protein  6.8 g/dL (6.3-8.2)   07/27/21  09:44    


 


Albumin  4.4 g/dL (3.9-5)   07/27/21  09:44    


 


Albumin/Globulin Ratio  1.8 %  07/27/21  09:44    


 


Lipase  99 units/L (13-60)  H  07/27/21  09:44    


 


Urine Color  Yellow  (Yellow)   07/27/21  Unknown


 


Urine Turbidity  Clear  (Clear)   07/27/21  Unknown


 


Urine pH  5.0  (5.0-7.0)   07/27/21  Unknown


 


Ur Specific Gravity  1.035  (1.003-1.030)  H  07/27/21  Unknown


 


Urine Protein  <15 mg/dl mg/dL (Negative)   07/27/21  Unknown


 


Urine Glucose (UA)  Neg mg/dL (Negative)   07/27/21  Unknown


 


Urine Ketones  80 mg/dL (Negative)   07/27/21  Unknown


 


Urine Blood  Neg  (Negative)   07/27/21  Unknown


 


Urine Nitrite  Neg  (Negative)   07/27/21  Unknown


 


Urine Bilirubin  Neg  (Negative)   07/27/21  Unknown


 


Urine Urobilinogen  < 2.0 mg/dL (<2.0)   07/27/21  Unknown


 


Ur Leukocyte Esterase  Neg  (Negative)   07/27/21  Unknown


 


Urine WBC (Auto)  < 1.0 /HPF (0.0-6.0)   07/27/21  Unknown


 


Urine RBC (Auto)  < 1.0 /HPF (0.0-6.0)   07/27/21  Unknown


 


U Epithel Cells (Auto)  < 1.0 /HPF (0-13.0)   07/27/21  Unknown


 


Uric Acid Crystals  Few   07/27/21  Unknown


 


Urine Mucus  Few /HPF  07/27/21  Unknown


 


Urine Opiates Screen  Negative   07/27/21  Unknown


 


Urine Methadone Screen  Negative   07/27/21  Unknown


 


Ur Barbiturates Screen  Negative   07/27/21  Unknown


 


Ur Phencyclidine Scrn  Negative   07/27/21  Unknown


 


Ur Amphetamines Screen  Negative   07/27/21  Unknown


 


U Benzodiazepines Scrn  Negative   07/27/21  Unknown


 


Urine Cocaine Screen  Positive   07/27/21  Unknown


 


U Marijuana (THC) Screen  Negative   07/27/21  Unknown


 


Drugs of Abuse Note  Disclamer   07/27/21  Unknown











Microbiology: 


Microbiology





07/27/21 Unknown   Abdomen   Surgical Culture - Preliminary








[place]/IV: 


                                        





Voiding Method                   Toilet











Active Medications





- Current Medications


Current Medications: 














Generic Name Dose Route Start Last Admin





  Trade Name Freq  PRN Reason Stop Dose Admin


 


Acetaminophen  650 mg  07/27/21 21:17 





  Acetaminophen 325 Mg Tab  PO  





  Q4H PRN  





  Pain MILD(1-3)/Fever >100.5/HA  


 


Hydromorphone HCl  0.5 mg  07/27/21 21:17  07/30/21 22:41





  Hydromorphone 1 Mg/1 Ml Inj  IV   0.5 mg





  Q3H PRN   Administration





  Pain , Severe (7-10)  


 


Dextrose/Sodium Chloride  1,000 mls @ 75 mls/hr  07/27/21 22:00  07/31/21 10:43





  D5ns  IV   75 mls/hr





  AS DIRECT LG   Administration


 


Piperacillin Sod/Tazobactam Sod  4.5 gm in 100 mls @ 200 mls/hr  07/28/21 14:00 

07/31/21 07:31





  Zosyn/Ns 4.5gm/100ml  IV   200 mls/hr





  Q8HR LG   Administration


 


Metoclopramide HCl  10 mg  07/27/21 21:17 





  Metoclopramide 10 Mg/2 Ml Inj  IV  





  Q6H PRN  





  Nausea And Vomiting  


 


Morphine Sulfate  2 mg  07/27/21 21:17  07/30/21 20:02





  Morphine 2 Mg/1 Ml Inj  IV   2 mg





  Q4H PRN   Administration





  Pain, Moderate (4-6)  


 


Ondansetron HCl  4 mg  07/27/21 21:17 





  Ondansetron 4 Mg/2 Ml Inj  IV  





  Q3H PRN  





  Nausea And Vomiting  


 


Pantoprazole Sodium  40 mg  07/27/21 22:00  07/31/21 10:43





  Pantoprazole 40 Mg Inj  IV   40 mg





  BID LG   Administration


 


Sodium Chloride  10 ml  07/27/21 22:00  07/31/21 10:45





  Sodium Chloride 0.9% 10 Ml Flush Syringe  IV   Not Given





  BID LG  


 


Sodium Chloride  10 ml  07/27/21 21:17 





  Sodium Chloride 0.9% 10 Ml Flush Syringe  IV  





  PRN PRN  





  LINE FLUSH  


 


Sodium Hypochlorite  1 applic  07/29/21 14:00  07/31/21 07:34





  Sodium Hypochlorite, Dakin's Full Strength (0.5%) 473 Ml Topical Soln  TP  

08/12/21 13:59  Not Given





  Q12H LG

## 2021-07-31 NOTE — PROGRESS NOTE
Assessment and Plan





41-year-old male postop day 4 status post ex lap with Davian patch repair of 

perforated gastric ulcer.  Clinically with postop ileus that is showing signs of

resolution with the exception of marce NGT output.  remains afebrile and stable.

 We will continue NG tube to suction and wait for further return of bowel 

function.  Will order upper GI to test integrity of repair prior to removal of 

NG tube.


Asked patient to limit the amount of ice chips he is taking to better account 

for recorded NG tube output





Subjective


Date of service: 07/31/21


Narrative: 





No acute events overnight.  Patient says he started to pass gas within the last 

15 minutes.  Patient says his pain varies from 5-8 out of 10.  Patient has been 

ambulating without difficulty.  Patient denies eating or drinking with the 

exception of some ice chips however patient had high output out of his NG tube 

over the last 24 hours.





Objective


                               Vital Signs - 12hr











  07/31/21 07/31/21 07/31/21





  03:50 05:43 08:08


 


Temperature  97.8 F 98.6 F


 


Pulse Rate  81 85


 


Respiratory  18 18





Rate   


 


Blood Pressure  126/92 129/94


 


O2 Sat by Pulse 98 97 96





Oximetry   














  07/31/21 07/31/21





  10:00 12:05


 


Temperature  98.7 F


 


Pulse Rate  80


 


Respiratory  18





Rate  


 


Blood Pressure  144/96


 


O2 Sat by Pulse 98 93





Oximetry  














- General physical appearance


well developed, moderate pain





- Respiratory


normal expansion, normal respiratory effort





- Abdomen


soft, other (incision with clean packing, appropriately tender to palpation, NGT

with brown biliious drainage.)





- Labs





                                 07/30/21 08:30





                                 07/30/21 08:30

## 2021-08-01 LAB
BASOPHILS # (AUTO): 0 K/MM3 (ref 0–0.1)
BASOPHILS NFR BLD AUTO: 0.2 % (ref 0–1.8)
EOSINOPHIL # BLD AUTO: 0.1 K/MM3 (ref 0–0.4)
EOSINOPHIL NFR BLD AUTO: 0.8 % (ref 0–4.3)
HCT VFR BLD CALC: 39.8 % (ref 35.5–45.6)
HGB BLD-MCNC: 13.6 GM/DL (ref 11.8–15.2)
LYMPHOCYTES # BLD AUTO: 0.9 K/MM3 (ref 1.2–5.4)
LYMPHOCYTES NFR BLD AUTO: 12.9 % (ref 13.4–35)
MCHC RBC AUTO-ENTMCNC: 34 % (ref 32–34)
MCV RBC AUTO: 92 FL (ref 84–94)
MONOCYTES # (AUTO): 1.1 K/MM3 (ref 0–0.8)
MONOCYTES % (AUTO): 15.3 % (ref 0–7.3)
PLATELET # BLD: 276 K/MM3 (ref 140–440)
RBC # BLD AUTO: 4.35 M/MM3 (ref 3.65–5.03)

## 2021-08-01 RX ADMIN — Medication SCH ML: at 22:47

## 2021-08-01 RX ADMIN — Medication SCH: at 02:30

## 2021-08-01 RX ADMIN — DEXTROSE AND SODIUM CHLORIDE SCH MLS/HR: 5; .9 INJECTION, SOLUTION INTRAVENOUS at 01:35

## 2021-08-01 RX ADMIN — PIPERACILLIN AND TAZOBACTAM SCH MLS/HR: 4; .5 INJECTION, POWDER, FOR SOLUTION INTRAVENOUS at 05:58

## 2021-08-01 RX ADMIN — DEXTROSE AND SODIUM CHLORIDE SCH MLS/HR: 5; .9 INJECTION, SOLUTION INTRAVENOUS at 19:01

## 2021-08-01 RX ADMIN — Medication SCH APPLICATIO: at 13:06

## 2021-08-01 RX ADMIN — PANTOPRAZOLE SODIUM SCH MG: 40 INJECTION, POWDER, FOR SOLUTION INTRAVENOUS at 10:18

## 2021-08-01 RX ADMIN — PIPERACILLIN AND TAZOBACTAM SCH MLS/HR: 4; .5 INJECTION, POWDER, FOR SOLUTION INTRAVENOUS at 22:47

## 2021-08-01 RX ADMIN — Medication SCH ML: at 10:19

## 2021-08-01 RX ADMIN — PIPERACILLIN AND TAZOBACTAM SCH MLS/HR: 4; .5 INJECTION, POWDER, FOR SOLUTION INTRAVENOUS at 13:05

## 2021-08-01 RX ADMIN — PANTOPRAZOLE SODIUM SCH MG: 40 INJECTION, POWDER, FOR SOLUTION INTRAVENOUS at 22:47

## 2021-08-01 NOTE — PROGRESS NOTE
Assessment and Plan





- Patient Problems


(1) Perforated abdominal viscus


Current Visit: Yes   Status: Acute   


Plan to address problem: 


Patient status post surgical intervention.  Continue management as per surgical 

team recommendations.








(2) Peritonitis


Current Visit: Yes   Status: Acute   


Plan to address problem: 


IV antibiotic therapy, supportive care, repeat CBC in a.m.








(3) Dehydration


Current Visit: Yes   Status: Acute   


Plan to address problem: 


IV fluid resuscitation therapy, supportive care, monitor urine output every 

shift.








(4) DVT prophylaxis


Current Visit: Yes   Status: Acute   


Plan to address problem: 


SCD to bilateral lower extremities while in bed








History


Interval history: 


40 YO Male with perforated viscus complicated by peritonitis, volume depletion. 

No reported nursing events.  No reports of pain.  Patient resting comfortably in

bed.  Patient denies fever, chills, chest pain, nausea vomiting diarrhea.








Hospitalist Physical





- Constitutional


Vitals: 


                                        











Temp Pulse Resp BP Pulse Ox


 


 98.0 F   76   16   117/80   96 


 


 08/01/21 11:59  08/01/21 11:59  08/01/21 11:59  08/01/21 11:59  08/01/21 11:59











General appearance: Present: no acute distress, well-nourished





- EENT


Eyes: Present: PERRL, EOM intact


ENT: hearing intact





- Neck


Neck: Present: supple





- Respiratory


Respiratory effort: normal


Respiratory: bilateral: CTA





- Cardiovascular


Rhythm: regular


Heart Sounds: Present: S1 & S2





- Extremities


Extremities: no ischemia


Peripheral Pulses: within normal limits





- Abdominal


General gastrointestinal: soft, non-distended, other (Appropriately tender), no 

hepatomegaly, no splenomegaly





- Integumentary


Integumentary: Present: clear, dry





- Psychiatric


Psychiatric: appropriate mood/affect, cooperative





Results





- Labs


CBC & Chem 7: 


                                 08/01/21 04:30





                                 07/30/21 08:30


Labs: 


                             Laboratory Last Values











WBC  6.9 K/mm3 (4.5-11.0)   08/01/21  04:30    


 


RBC  4.35 M/mm3 (3.65-5.03)   08/01/21  04:30    


 


Hgb  13.6 gm/dl (11.8-15.2)   08/01/21  04:30    


 


Hct  39.8 % (35.5-45.6)   08/01/21  04:30    


 


MCV  92 fl (84-94)   08/01/21  04:30    


 


MCH  31 pg (28-32)   08/01/21  04:30    


 


MCHC  34 % (32-34)   08/01/21  04:30    


 


RDW  16.4 % (13.2-15.2)  H  08/01/21  04:30    


 


Plt Count  276 K/mm3 (140-440)   08/01/21  04:30    


 


Lymph % (Auto)  12.9 % (13.4-35.0)  L  08/01/21  04:30    


 


Mono % (Auto)  15.3 % (0.0-7.3)  H  08/01/21  04:30    


 


Eos % (Auto)  0.8 % (0.0-4.3)   08/01/21  04:30    


 


Baso % (Auto)  0.2 % (0.0-1.8)   08/01/21  04:30    


 


Lymph # (Auto)  0.9 K/mm3 (1.2-5.4)  L  08/01/21  04:30    


 


Mono # (Auto)  1.1 K/mm3 (0.0-0.8)  H  08/01/21  04:30    


 


Eos # (Auto)  0.1 K/mm3 (0.0-0.4)   08/01/21  04:30    


 


Baso # (Auto)  0.0 K/mm3 (0.0-0.1)   08/01/21  04:30    


 


Add Manual Diff  Complete   07/27/21  09:44    


 


Total Counted  100   07/27/21  09:44    


 


Seg Neutrophils %  70.8 % (40.0-70.0)  H  08/01/21  04:30    


 


Seg Neuts % (Manual)  75.0 % (40.0-70.0)  H  07/27/21  09:44    


 


Band Neutrophils %  14.0 %  07/27/21  09:44    


 


Lymphocytes % (Manual)  9.0 % (13.4-35.0)  L  07/27/21  09:44    


 


Monocytes % (Manual)  2.0 % (0.0-7.3)   07/27/21  09:44    


 


Nucleated RBC %  Not Reportable   07/27/21  09:44    


 


Seg Neutrophils #  4.9 K/mm3 (1.8-7.7)   08/01/21  04:30    


 


Seg Neutrophils # Man  5.3 K/mm3 (1.8-7.7)   07/27/21  09:44    


 


Band Neutrophils #  1.0 K/mm3  07/27/21  09:44    


 


Lymphocytes # (Manual)  0.6 K/mm3 (1.2-5.4)  L  07/27/21  09:44    


 


Abs React Lymphs (Man)  0.0 K/mm3  07/27/21  09:44    


 


Monocytes # (Manual)  0.1 K/mm3 (0.0-0.8)   07/27/21  09:44    


 


Eosinophils # (Manual)  0.0 K/mm3 (0.0-0.4)   07/27/21  09:44    


 


Basophils # (Manual)  0.0 K/mm3 (0.0-0.1)   07/27/21  09:44    


 


Metamyelocytes #  0.0 K/mm3  07/27/21  09:44    


 


Myelocytes #  0.0 K/mm3  07/27/21  09:44    


 


Promyelocytes #  0.0 K/mm3  07/27/21  09:44    


 


Blast Cells #  0.0 K/mm3  07/27/21  09:44    


 


WBC Morphology  Not Reportable   07/27/21  09:44    


 


Hypersegmented Neuts  Not Reportable   07/27/21  09:44    


 


Hyposegmented Neuts  Not Reportable   07/27/21  09:44    


 


Hypogranular Neuts  Not Reportable   07/27/21  09:44    


 


Smudge Cells  Not Reportable   07/27/21  09:44    


 


Toxic Granulation  Not Reportable   07/27/21  09:44    


 


Toxic Vacuolation  Not Reportable   07/27/21  09:44    


 


Dohle Bodies  Not Reportable   07/27/21  09:44    


 


Pelger-Huet Anomaly  Not Reportable   07/27/21  09:44    


 


Kerline Rods  Not Reportable   07/27/21  09:44    


 


Platelet Estimate  Consistent w auto   07/27/21  09:44    


 


Clumped Platelets  Not Reportable   07/27/21  09:44    


 


Plt Clumps, EDTA  Not Reportable   07/27/21  09:44    


 


Large Platelets  Not Reportable   07/27/21  09:44    


 


Giant Platelets  Not Reportable   07/27/21  09:44    


 


Platelet Satelliting  Not Reportable   07/27/21  09:44    


 


Plt Morphology Comment  Not Reportable   07/27/21  09:44    


 


RBC Morphology  Normal   07/27/21  09:44    


 


Dimorphic RBCs  Not Reportable   07/27/21  09:44    


 


Polychromasia  Not Reportable   07/27/21  09:44    


 


Hypochromasia  Not Reportable   07/27/21  09:44    


 


Poikilocytosis  Not Reportable   07/27/21  09:44    


 


Anisocytosis  Not Reportable   07/27/21  09:44    


 


Microcytosis  Not Reportable   07/27/21  09:44    


 


Macrocytosis  Not Reportable   07/27/21  09:44    


 


Spherocytes  Not Reportable   07/27/21  09:44    


 


Pappenheimer Bodies  Not Reportable   07/27/21  09:44    


 


Sickle Cells  Not Reportable   07/27/21  09:44    


 


Target Cells  Not Reportable   07/27/21  09:44    


 


Tear Drop Cells  Not Reportable   07/27/21  09:44    


 


Ovalocytes  Not Reportable   07/27/21  09:44    


 


Helmet Cells  Not Reportable   07/27/21  09:44    


 


Adams-Keeler Bodies  Not Reportable   07/27/21  09:44    


 


Cabot Rings  Not Reportable   07/27/21  09:44    


 


Kevil Cells  Not Reportable   07/27/21  09:44    


 


Bite Cells  Not Reportable   07/27/21  09:44    


 


Crenated Cell  Not Reportable   07/27/21  09:44    


 


Elliptocytes  Not Reportable   07/27/21  09:44    


 


Acanthocytes (Spur)  Not Reportable   07/27/21  09:44    


 


Rouleaux  Not Reportable   07/27/21  09:44    


 


Hemoglobin C Crystals  Not Reportable   07/27/21  09:44    


 


Schistocytes  Not Reportable   07/27/21  09:44    


 


Malaria parasites  Not Reportable   07/27/21  09:44    


 


Garo Bodies  Not Reportable   07/27/21  09:44    


 


Hem Pathologist Commnt  No   07/27/21  09:44    


 


Sodium  141 mmol/L (137-145)   07/30/21  08:30    


 


Potassium  3.6 mmol/L (3.6-5.0)   07/30/21  08:30    


 


Chloride  106.4 mmol/L ()   07/30/21  08:30    


 


Carbon Dioxide  23 mmol/L (22-30)   07/30/21  08:30    


 


Anion Gap  15 mmol/L  07/30/21  08:30    


 


BUN  8 mg/dL (9-20)  L  07/30/21  08:30    


 


Creatinine  0.9 mg/dL (0.8-1.3)   07/30/21  08:30    


 


Estimated GFR  > 60 ml/min  07/30/21  08:30    


 


BUN/Creatinine Ratio  9 %  07/30/21  08:30    


 


Glucose  116 mg/dL ()  H  07/30/21  08:30    


 


Hemoglobin A1c  5.8 % (4-6)   07/28/21  06:41    


 


Calcium  8.4 mg/dL (8.4-10.2)   07/30/21  08:30    


 


Total Bilirubin  0.60 mg/dL (0.1-1.2)   07/27/21  09:44    


 


Direct Bilirubin  < 0.2 mg/dL (0-0.2)   07/27/21  09:44    


 


Indirect Bilirubin  0.4 mg/dL  07/27/21  09:44    


 


AST  40 units/L (5-40)   07/27/21  09:44    


 


ALT  21 units/L (7-56)   07/27/21  09:44    


 


Alkaline Phosphatase  56 units/L ()   07/27/21  09:44    


 


Total Protein  6.8 g/dL (6.3-8.2)   07/27/21  09:44    


 


Albumin  4.4 g/dL (3.9-5)   07/27/21  09:44    


 


Albumin/Globulin Ratio  1.8 %  07/27/21  09:44    


 


Lipase  99 units/L (13-60)  H  07/27/21  09:44    


 


Urine Color  Yellow  (Yellow)   07/27/21  Unknown


 


Urine Turbidity  Clear  (Clear)   07/27/21  Unknown


 


Urine pH  5.0  (5.0-7.0)   07/27/21  Unknown


 


Ur Specific Gravity  1.035  (1.003-1.030)  H  07/27/21  Unknown


 


Urine Protein  <15 mg/dl mg/dL (Negative)   07/27/21  Unknown


 


Urine Glucose (UA)  Neg mg/dL (Negative)   07/27/21  Unknown


 


Urine Ketones  80 mg/dL (Negative)   07/27/21  Unknown


 


Urine Blood  Neg  (Negative)   07/27/21  Unknown


 


Urine Nitrite  Neg  (Negative)   07/27/21  Unknown


 


Urine Bilirubin  Neg  (Negative)   07/27/21  Unknown


 


Urine Urobilinogen  < 2.0 mg/dL (<2.0)   07/27/21  Unknown


 


Ur Leukocyte Esterase  Neg  (Negative)   07/27/21  Unknown


 


Urine WBC (Auto)  < 1.0 /HPF (0.0-6.0)   07/27/21  Unknown


 


Urine RBC (Auto)  < 1.0 /HPF (0.0-6.0)   07/27/21  Unknown


 


U Epithel Cells (Auto)  < 1.0 /HPF (0-13.0)   07/27/21  Unknown


 


Uric Acid Crystals  Few   07/27/21  Unknown


 


Urine Mucus  Few /HPF  07/27/21  Unknown


 


Urine Opiates Screen  Negative   07/27/21  Unknown


 


Urine Methadone Screen  Negative   07/27/21  Unknown


 


Ur Barbiturates Screen  Negative   07/27/21  Unknown


 


Ur Phencyclidine Scrn  Negative   07/27/21  Unknown


 


Ur Amphetamines Screen  Negative   07/27/21  Unknown


 


U Benzodiazepines Scrn  Negative   07/27/21  Unknown


 


Urine Cocaine Screen  Positive   07/27/21  Unknown


 


U Marijuana (THC) Screen  Negative   07/27/21  Unknown


 


Drugs of Abuse Note  Disclamer   07/27/21  Unknown











Microbiology: 


Microbiology





07/27/21 Unknown   Abdomen   Anaerobic Culture - Final


07/27/21 Unknown   Abdomen   Surgical Culture - Final


                                Joan Albicans








E[place]anks/IV: 


                                        





Voiding Method                   Urinal











Active Medications





- Current Medications


Current Medications: 














Generic Name Dose Route Start Last Admin





  Trade Name Freq  PRN Reason Stop Dose Admin


 


Acetaminophen  650 mg  07/27/21 21:17 





  Acetaminophen 325 Mg Tab  PO  





  Q4H PRN  





  Pain MILD(1-3)/Fever >100.5/HA  


 


Hydromorphone HCl  0.5 mg  07/27/21 21:17  07/31/21 13:19





  Hydromorphone 1 Mg/1 Ml Inj  IV   0.5 mg





  Q3H PRN   Administration





  Pain , Severe (7-10)  


 


Dextrose/Sodium Chloride  1,000 mls @ 75 mls/hr  07/27/21 22:00  08/01/21 01:35





  D5ns  IV   75 mls/hr





  AS DIRECT LG   Administration


 


Piperacillin Sod/Tazobactam Sod  4.5 gm in 100 mls @ 200 mls/hr  07/28/21 14:00 

08/01/21 13:05





  Zosyn/Ns 4.5gm/100ml  IV   200 mls/hr





  Q8HR LG   Administration


 


Metoclopramide HCl  10 mg  07/27/21 21:17 





  Metoclopramide 10 Mg/2 Ml Inj  IV  





  Q6H PRN  





  Nausea And Vomiting  


 


Morphine Sulfate  2 mg  07/27/21 21:17  07/30/21 20:02





  Morphine 2 Mg/1 Ml Inj  IV   2 mg





  Q4H PRN   Administration





  Pain, Moderate (4-6)  


 


Ondansetron HCl  4 mg  07/27/21 21:17 





  Ondansetron 4 Mg/2 Ml Inj  IV  





  Q3H PRN  





  Nausea And Vomiting  


 


Pantoprazole Sodium  40 mg  07/27/21 22:00  08/01/21 10:18





  Pantoprazole 40 Mg Inj  IV   40 mg





  BID LG   Administration


 


Sodium Chloride  10 ml  07/27/21 22:00  08/01/21 10:19





  Sodium Chloride 0.9% 10 Ml Flush Syringe  IV   10 ml





  BID LG   Administration


 


Sodium Chloride  10 ml  07/27/21 21:17 





  Sodium Chloride 0.9% 10 Ml Flush Syringe  IV  





  PRN PRN  





  LINE FLUSH  


 


Sodium Hypochlorite  1 applic  07/29/21 14:00  08/01/21 13:06





  Sodium Hypochlorite, Dakin's Full Strength (0.5%) 473 Ml Topical Soln  TP  

08/12/21 13:59  1 applicatio





  Q12H LG   Administration

## 2021-08-01 NOTE — PROGRESS NOTE
Assessment and Plan





41-year-old male postop day 5 status post ex lap with Davian patch repair of 

perforated gastric ulcer.  Clinically with postop ileus that is showing signs of

resolution.  remains afebrile and stable.  We will do clamping trial today of NG

tube.  Upper GI to be performed in a.m. to evaluate repair prior to removal of 

NG tube and starting of p.o. liquids.








Subjective


Date of service: 08/01/21


Narrative: 





No acute overnight.  Patient is showering and ambulating without difficulty.  

Patient denies eating or drinking anything however nurse says that there was 

some sediment in the bottom of his NG tube canister consistent with food 

particulate.  Patient's NG tube output has decreased last 24 hours.  Patient is 

passing a lot of flatus and says that his pain is improved compared to previous.





Objective


                               Vital Signs - 12hr











  08/01/21 08/01/21 08/01/21





  05:44 07:41 11:47


 


Temperature 98.7 F 98.3 F 


 


Pulse Rate 72 74 


 


Respiratory 18 18 





Rate   


 


Blood Pressure 130/80 120/84 


 


O2 Sat by Pulse 96 95 99





Oximetry   














  08/01/21





  11:59


 


Temperature 98.0 F


 


Pulse Rate 76


 


Respiratory 16





Rate 


 


Blood Pressure 117/80


 


O2 Sat by Pulse 96





Oximetry 














- General physical appearance


well developed, well nourished, no distress, no pain





- Respiratory


normal expansion, normal respiratory effort





- Abdomen


soft, other (Midline incision packing removed mild serosanguineous drainage, 

appropriate tenderness to palpation, NG tube with gastric contents.)





- Labs





                                 08/01/21 04:30





                                 07/30/21 08:30

## 2021-08-02 LAB
ALBUMIN SERPL-MCNC: 2.7 G/DL (ref 3.9–5)
ALT SERPL-CCNC: 42 UNITS/L (ref 7–56)
BASOPHILS # (AUTO): 0 K/MM3 (ref 0–0.1)
BASOPHILS NFR BLD AUTO: 0.4 % (ref 0–1.8)
BUN SERPL-MCNC: 8 MG/DL (ref 9–20)
BUN/CREAT SERPL: 10 %
CALCIUM SERPL-MCNC: 8.1 MG/DL (ref 8.4–10.2)
EOSINOPHIL # BLD AUTO: 0.1 K/MM3 (ref 0–0.4)
EOSINOPHIL NFR BLD AUTO: 0.9 % (ref 0–4.3)
HCT VFR BLD CALC: 40.1 % (ref 35.5–45.6)
HEMOLYSIS INDEX: 16
HGB BLD-MCNC: 13.6 GM/DL (ref 11.8–15.2)
LYMPHOCYTES # BLD AUTO: 1.2 K/MM3 (ref 1.2–5.4)
LYMPHOCYTES NFR BLD AUTO: 13.5 % (ref 13.4–35)
MCHC RBC AUTO-ENTMCNC: 34 % (ref 32–34)
MCV RBC AUTO: 91 FL (ref 84–94)
MONOCYTES # (AUTO): 1.1 K/MM3 (ref 0–0.8)
MONOCYTES % (AUTO): 12.9 % (ref 0–7.3)
PLATELET # BLD: 283 K/MM3 (ref 140–440)
RBC # BLD AUTO: 4.39 M/MM3 (ref 3.65–5.03)

## 2021-08-02 RX ADMIN — PANTOPRAZOLE SODIUM SCH MG: 40 INJECTION, POWDER, FOR SOLUTION INTRAVENOUS at 22:30

## 2021-08-02 RX ADMIN — PANTOPRAZOLE SODIUM SCH MG: 40 INJECTION, POWDER, FOR SOLUTION INTRAVENOUS at 09:21

## 2021-08-02 RX ADMIN — Medication SCH: at 14:42

## 2021-08-02 RX ADMIN — Medication SCH APPLICATIO: at 02:52

## 2021-08-02 RX ADMIN — Medication SCH ML: at 09:22

## 2021-08-02 RX ADMIN — PIPERACILLIN AND TAZOBACTAM SCH MLS/HR: 4; .5 INJECTION, POWDER, FOR SOLUTION INTRAVENOUS at 13:30

## 2021-08-02 RX ADMIN — Medication SCH ML: at 22:30

## 2021-08-02 RX ADMIN — PIPERACILLIN AND TAZOBACTAM SCH MLS/HR: 4; .5 INJECTION, POWDER, FOR SOLUTION INTRAVENOUS at 22:30

## 2021-08-02 RX ADMIN — PIPERACILLIN AND TAZOBACTAM SCH MLS/HR: 4; .5 INJECTION, POWDER, FOR SOLUTION INTRAVENOUS at 05:52

## 2021-08-02 RX ADMIN — Medication SCH ML: at 12:50

## 2021-08-02 RX ADMIN — Medication SCH APPLICATIO: at 12:49

## 2021-08-02 NOTE — PROGRESS NOTE
Assessment and Plan





- Patient Problems


(1) Perforated abdominal viscus


Current Visit: Yes   Status: Acute   


Plan to address problem: 


1) CLD


 2) Nurses to instruct pt in how to do his wound care.


 3) Anticipate discharge in 24-48 hours.








Subjective


Date of service: 08/02/21


Patient Reports: Positive: no new complaints, feels better, pain is less, 

tolerating liquids well, bowel movement (Pt says he will do his own wound care.)





Objective


                               Vital Signs - 12hr











  08/02/21 08/02/21 08/02/21





  05:56 07:17 10:11


 


Temperature 99.4 F 98.6 F 


 


Pulse Rate 84 88 


 


Respiratory 18 16 16





Rate   


 


Blood Pressure 116/76 107/71 


 


O2 Sat by Pulse 91 93 98





Oximetry   














- Abdomen


soft, bowel sounds normal (NT; Wound is clean.)





- Labs





                                 08/02/21 04:28





                                 08/02/21 04:28


                                 Diabetes panel











  08/02/21 Range/Units





  04:28 


 


Sodium  139  (137-145)  mmol/L


 


Potassium  3.2 L  (3.6-5.0)  mmol/L


 


Chloride  105.2  ()  mmol/L


 


Carbon Dioxide  25  (22-30)  mmol/L


 


BUN  8 L  (9-20)  mg/dL


 


Creatinine  0.8  (0.8-1.3)  mg/dL


 


Glucose  112 H  ()  mg/dL


 


Calcium  8.1 L  (8.4-10.2)  mg/dL


 


AST  38  (5-40)  units/L


 


ALT  42  (7-56)  units/L


 


Alkaline Phosphatase  50  ()  units/L


 


Total Protein  5.8 L  (6.3-8.2)  g/dL


 


Albumin  2.7 L  (3.9-5)  g/dL








                                  Calcium panel











  08/02/21 Range/Units





  04:28 


 


Calcium  8.1 L  (8.4-10.2)  mg/dL


 


Albumin  2.7 L  (3.9-5)  g/dL








                                 Pituitary panel











  08/02/21 Range/Units





  04:28 


 


Sodium  139  (137-145)  mmol/L


 


Potassium  3.2 L  (3.6-5.0)  mmol/L


 


Chloride  105.2  ()  mmol/L


 


Carbon Dioxide  25  (22-30)  mmol/L


 


BUN  8 L  (9-20)  mg/dL


 


Creatinine  0.8  (0.8-1.3)  mg/dL


 


Glucose  112 H  ()  mg/dL


 


Calcium  8.1 L  (8.4-10.2)  mg/dL








                                  Adrenal panel











  08/02/21 Range/Units





  04:28 


 


Sodium  139  (137-145)  mmol/L


 


Potassium  3.2 L  (3.6-5.0)  mmol/L


 


Chloride  105.2  ()  mmol/L


 


Carbon Dioxide  25  (22-30)  mmol/L


 


BUN  8 L  (9-20)  mg/dL


 


Creatinine  0.8  (0.8-1.3)  mg/dL


 


Glucose  112 H  ()  mg/dL


 


Calcium  8.1 L  (8.4-10.2)  mg/dL


 


Total Bilirubin  0.20  (0.1-1.2)  mg/dL


 


AST  38  (5-40)  units/L


 


ALT  42  (7-56)  units/L


 


Alkaline Phosphatase  50  ()  units/L


 


Total Protein  5.8 L  (6.3-8.2)  g/dL


 


Albumin  2.7 L  (3.9-5)  g/dL

## 2021-08-02 NOTE — PROGRESS NOTE
Assessment and Plan


Assessment and plan: 


41-year-old male postop day 5 status post ex lap with Davian patch repair of 

perforated gastric ulcer.  Clinically with postop ileus that is showing signs of

resolution.  remains afebrile and stable.  





(1) Perforated abdominal viscus


Patient status post surgical intervention.  Continue management as per surgical 

team recommendations.


Continue clamping trials of NG tube per surgery.  Upper GI was performed this 

a.m. to evaluate repair prior to removal of NG tube and starting of p.o. 

liquids.





(2) Peritonitis


IV antibiotic therapy, supportive care, repeat CBC in a.m.





(3) Dehydration


IV fluid resuscitation therapy, supportive care, monitor urine output every 

shift.





(4) DVT prophylaxis


SCD to bilateral lower extremities while in bed





History


Interval history: 





No new issues.





Hospitalist Physical





- Constitutional


Vitals: 


                                        











Temp Pulse Resp BP Pulse Ox


 


 98.6 F   88   16   107/71   98 


 


 08/02/21 07:17  08/02/21 07:17  08/02/21 10:11  08/02/21 07:17  08/02/21 10:11











General appearance: Present: no acute distress, well-nourished





- EENT


Eyes: Present: PERRL, EOM intact


ENT: hearing intact, clear oral mucosa, dentition normal





- Neck


Neck: Present: supple, normal ROM





- Respiratory


Respiratory effort: normal


Respiratory: bilateral: CTA





- Cardiovascular


Rhythm: regular


Heart Sounds: Present: S1 & S2.  Absent: gallop, rub





- Extremities


Extremities: no ischemia, No edema, Full ROM





- Abdominal


General gastrointestinal: soft, non-tender, non-distended, normal bowel sounds





- Integumentary


Integumentary: Present: clear, warm, dry





- Neurologic


Neurologic: CNII-XII intact, moves all extremities





Results





- Labs


CBC & Chem 7: 


                                 08/02/21 04:28





                                 08/02/21 04:28


Labs: 


                             Laboratory Last Values











WBC  8.8 K/mm3 (4.5-11.0)   08/02/21  04:28    


 


RBC  4.39 M/mm3 (3.65-5.03)   08/02/21  04:28    


 


Hgb  13.6 gm/dl (11.8-15.2)   08/02/21  04:28    


 


Hct  40.1 % (35.5-45.6)   08/02/21  04:28    


 


MCV  91 fl (84-94)   08/02/21  04:28    


 


MCH  31 pg (28-32)   08/02/21  04:28    


 


MCHC  34 % (32-34)   08/02/21  04:28    


 


RDW  16.2 % (13.2-15.2)  H  08/02/21  04:28    


 


Plt Count  283 K/mm3 (140-440)   08/02/21  04:28    


 


Lymph % (Auto)  13.5 % (13.4-35.0)   08/02/21  04:28    


 


Mono % (Auto)  12.9 % (0.0-7.3)  H  08/02/21  04:28    


 


Eos % (Auto)  0.9 % (0.0-4.3)   08/02/21  04:28    


 


Baso % (Auto)  0.4 % (0.0-1.8)   08/02/21  04:28    


 


Lymph # (Auto)  1.2 K/mm3 (1.2-5.4)   08/02/21  04:28    


 


Mono # (Auto)  1.1 K/mm3 (0.0-0.8)  H  08/02/21  04:28    


 


Eos # (Auto)  0.1 K/mm3 (0.0-0.4)   08/02/21  04:28    


 


Baso # (Auto)  0.0 K/mm3 (0.0-0.1)   08/02/21  04:28    


 


Add Manual Diff  Complete   07/27/21  09:44    


 


Total Counted  100   07/27/21  09:44    


 


Seg Neutrophils %  72.3 % (40.0-70.0)  H  08/02/21  04:28    


 


Seg Neuts % (Manual)  75.0 % (40.0-70.0)  H  07/27/21  09:44    


 


Band Neutrophils %  14.0 %  07/27/21  09:44    


 


Lymphocytes % (Manual)  9.0 % (13.4-35.0)  L  07/27/21  09:44    


 


Monocytes % (Manual)  2.0 % (0.0-7.3)   07/27/21  09:44    


 


Nucleated RBC %  Not Reportable   07/27/21  09:44    


 


Seg Neutrophils #  6.3 K/mm3 (1.8-7.7)   08/02/21  04:28    


 


Seg Neutrophils # Man  5.3 K/mm3 (1.8-7.7)   07/27/21  09:44    


 


Band Neutrophils #  1.0 K/mm3  07/27/21  09:44    


 


Lymphocytes # (Manual)  0.6 K/mm3 (1.2-5.4)  L  07/27/21  09:44    


 


Abs React Lymphs (Man)  0.0 K/mm3  07/27/21  09:44    


 


Monocytes # (Manual)  0.1 K/mm3 (0.0-0.8)   07/27/21  09:44    


 


Eosinophils # (Manual)  0.0 K/mm3 (0.0-0.4)   07/27/21  09:44    


 


Basophils # (Manual)  0.0 K/mm3 (0.0-0.1)   07/27/21  09:44    


 


Metamyelocytes #  0.0 K/mm3  07/27/21  09:44    


 


Myelocytes #  0.0 K/mm3  07/27/21  09:44    


 


Promyelocytes #  0.0 K/mm3  07/27/21  09:44    


 


Blast Cells #  0.0 K/mm3  07/27/21  09:44    


 


WBC Morphology  Not Reportable   07/27/21  09:44    


 


Hypersegmented Neuts  Not Reportable   07/27/21  09:44    


 


Hyposegmented Neuts  Not Reportable   07/27/21  09:44    


 


Hypogranular Neuts  Not Reportable   07/27/21  09:44    


 


Smudge Cells  Not Reportable   07/27/21  09:44    


 


Toxic Granulation  Not Reportable   07/27/21  09:44    


 


Toxic Vacuolation  Not Reportable   07/27/21  09:44    


 


Dohle Bodies  Not Reportable   07/27/21  09:44    


 


Pelger-Huet Anomaly  Not Reportable   07/27/21  09:44    


 


Kerline Rods  Not Reportable   07/27/21  09:44    


 


Platelet Estimate  Consistent w auto   07/27/21  09:44    


 


Clumped Platelets  Not Reportable   07/27/21  09:44    


 


Plt Clumps, EDTA  Not Reportable   07/27/21  09:44    


 


Large Platelets  Not Reportable   07/27/21  09:44    


 


Giant Platelets  Not Reportable   07/27/21  09:44    


 


Platelet Satelliting  Not Reportable   07/27/21  09:44    


 


Plt Morphology Comment  Not Reportable   07/27/21  09:44    


 


RBC Morphology  Normal   07/27/21  09:44    


 


Dimorphic RBCs  Not Reportable   07/27/21  09:44    


 


Polychromasia  Not Reportable   07/27/21  09:44    


 


Hypochromasia  Not Reportable   07/27/21  09:44    


 


Poikilocytosis  Not Reportable   07/27/21  09:44    


 


Anisocytosis  Not Reportable   07/27/21  09:44    


 


Microcytosis  Not Reportable   07/27/21  09:44    


 


Macrocytosis  Not Reportable   07/27/21  09:44    


 


Spherocytes  Not Reportable   07/27/21  09:44    


 


Pappenheimer Bodies  Not Reportable   07/27/21  09:44    


 


Sickle Cells  Not Reportable   07/27/21  09:44    


 


Target Cells  Not Reportable   07/27/21  09:44    


 


Tear Drop Cells  Not Reportable   07/27/21  09:44    


 


Ovalocytes  Not Reportable   07/27/21  09:44    


 


Helmet Cells  Not Reportable   07/27/21  09:44    


 


Adams-Panther Bodies  Not Reportable   07/27/21  09:44    


 


Cabot Rings  Not Reportable   07/27/21  09:44    


 


Shaun Cells  Not Reportable   07/27/21  09:44    


 


Bite Cells  Not Reportable   07/27/21  09:44    


 


Crenated Cell  Not Reportable   07/27/21  09:44    


 


Elliptocytes  Not Reportable   07/27/21  09:44    


 


Acanthocytes (Spur)  Not Reportable   07/27/21  09:44    


 


Rouleaux  Not Reportable   07/27/21  09:44    


 


Hemoglobin C Crystals  Not Reportable   07/27/21  09:44    


 


Schistocytes  Not Reportable   07/27/21  09:44    


 


Malaria parasites  Not Reportable   07/27/21  09:44    


 


Garo Bodies  Not Reportable   07/27/21  09:44    


 


Hem Pathologist Commnt  No   07/27/21  09:44    


 


Sodium  139 mmol/L (137-145)   08/02/21  04:28    


 


Potassium  3.2 mmol/L (3.6-5.0)  L  08/02/21  04:28    


 


Chloride  105.2 mmol/L ()   08/02/21  04:28    


 


Carbon Dioxide  25 mmol/L (22-30)   08/02/21  04:28    


 


Anion Gap  12 mmol/L  08/02/21  04:28    


 


BUN  8 mg/dL (9-20)  L  08/02/21  04:28    


 


Creatinine  0.8 mg/dL (0.8-1.3)   08/02/21  04:28    


 


Estimated GFR  > 60 ml/min  08/02/21  04:28    


 


BUN/Creatinine Ratio  10 %  08/02/21  04:28    


 


Glucose  112 mg/dL ()  H  08/02/21  04:28    


 


Hemoglobin A1c  5.8 % (4-6)   07/28/21  06:41    


 


Calcium  8.1 mg/dL (8.4-10.2)  L  08/02/21  04:28    


 


Total Bilirubin  0.20 mg/dL (0.1-1.2)   08/02/21  04:28    


 


Direct Bilirubin  < 0.2 mg/dL (0-0.2)   07/27/21  09:44    


 


Indirect Bilirubin  0.4 mg/dL  07/27/21  09:44    


 


AST  38 units/L (5-40)   08/02/21  04:28    


 


ALT  42 units/L (7-56)   08/02/21  04:28    


 


Alkaline Phosphatase  50 units/L ()   08/02/21  04:28    


 


Total Protein  5.8 g/dL (6.3-8.2)  L  08/02/21  04:28    


 


Albumin  2.7 g/dL (3.9-5)  L  08/02/21  04:28    


 


Albumin/Globulin Ratio  0.9 %  08/02/21  04:28    


 


Lipase  99 units/L (13-60)  H  07/27/21  09:44    


 


Urine Color  Yellow  (Yellow)   07/27/21  Unknown


 


Urine Turbidity  Clear  (Clear)   07/27/21  Unknown


 


Urine pH  5.0  (5.0-7.0)   07/27/21  Unknown


 


Ur Specific Gravity  1.035  (1.003-1.030)  H  07/27/21  Unknown


 


Urine Protein  <15 mg/dl mg/dL (Negative)   07/27/21  Unknown


 


Urine Glucose (UA)  Neg mg/dL (Negative)   07/27/21  Unknown


 


Urine Ketones  80 mg/dL (Negative)   07/27/21  Unknown


 


Urine Blood  Neg  (Negative)   07/27/21  Unknown


 


Urine Nitrite  Neg  (Negative)   07/27/21  Unknown


 


Urine Bilirubin  Neg  (Negative)   07/27/21  Unknown


 


Urine Urobilinogen  < 2.0 mg/dL (<2.0)   07/27/21  Unknown


 


Ur Leukocyte Esterase  Neg  (Negative)   07/27/21  Unknown


 


Urine WBC (Auto)  < 1.0 /HPF (0.0-6.0)   07/27/21  Unknown


 


Urine RBC (Auto)  < 1.0 /HPF (0.0-6.0)   07/27/21  Unknown


 


U Epithel Cells (Auto)  < 1.0 /HPF (0-13.0)   07/27/21  Unknown


 


Uric Acid Crystals  Few   07/27/21  Unknown


 


Urine Mucus  Few /HPF  07/27/21  Unknown


 


Urine Opiates Screen  Negative   07/27/21  Unknown


 


Urine Methadone Screen  Negative   07/27/21  Unknown


 


Ur Barbiturates Screen  Negative   07/27/21  Unknown


 


Ur Phencyclidine Scrn  Negative   07/27/21  Unknown


 


Ur Amphetamines Screen  Negative   07/27/21  Unknown


 


U Benzodiazepines Scrn  Negative   07/27/21  Unknown


 


Urine Cocaine Screen  Positive   07/27/21  Unknown


 


U Marijuana (THC) Screen  Negative   07/27/21  Unknown


 


Drugs of Abuse Note  Disclamer   07/27/21  Unknown











[place]/IV: 


                                        





Voiding Method                   Toilet











Active Medications





- Current Medications


Current Medications: 














Generic Name Dose Route Start Last Admin





  Trade Name Freq  PRN Reason Stop Dose Admin


 


Acetaminophen  650 mg  07/27/21 21:17 





  Acetaminophen 325 Mg Tab  PO  





  Q4H PRN  





  Pain MILD(1-3)/Fever >100.5/HA  


 


Hydromorphone HCl  0.5 mg  07/27/21 21:17  07/31/21 13:19





  Hydromorphone 1 Mg/1 Ml Inj  IV   0.5 mg





  Q3H PRN   Administration





  Pain , Severe (7-10)  


 


Dextrose/Sodium Chloride  1,000 mls @ 75 mls/hr  07/27/21 22:00  08/01/21 19:01





  D5ns  IV   75 mls/hr





  AS DIRECT LG   Administration


 


Piperacillin Sod/Tazobactam Sod  4.5 gm in 100 mls @ 200 mls/hr  07/28/21 14:00 

08/02/21 05:52





  Zosyn/Ns 4.5gm/100ml  IV   100 mls/hr





  Q8HR LG   Administration


 


Metoclopramide HCl  10 mg  07/27/21 21:17 





  Metoclopramide 10 Mg/2 Ml Inj  IV  





  Q6H PRN  





  Nausea And Vomiting  


 


Morphine Sulfate  2 mg  07/27/21 21:17  07/30/21 20:02





  Morphine 2 Mg/1 Ml Inj  IV   2 mg





  Q4H PRN   Administration





  Pain, Moderate (4-6)  


 


Ondansetron HCl  4 mg  07/27/21 21:17 





  Ondansetron 4 Mg/2 Ml Inj  IV  





  Q3H PRN  





  Nausea And Vomiting  


 


Pantoprazole Sodium  40 mg  07/27/21 22:00  08/02/21 09:21





  Pantoprazole 40 Mg Inj  IV   40 mg





  BID LG   Administration


 


Sodium Chloride  10 ml  07/27/21 22:00  08/02/21 09:22





  Sodium Chloride 0.9% 10 Ml Flush Syringe  IV   10 ml





  BID LG   Administration


 


Sodium Chloride  10 ml  07/27/21 21:17 





  Sodium Chloride 0.9% 10 Ml Flush Syringe  IV  





  PRN PRN  





  LINE FLUSH  


 


Sodium Hypochlorite  1 applic  07/29/21 14:00  08/02/21 02:52





  Sodium Hypochlorite, Dakin's Full Strength (0.5%) 473 Ml Topical Soln  TP  

08/12/21 13:59  1 applicatio





  Q12H LG   Administration

## 2021-08-03 VITALS — SYSTOLIC BLOOD PRESSURE: 130 MMHG | DIASTOLIC BLOOD PRESSURE: 75 MMHG

## 2021-08-03 LAB
BASOPHILS # (AUTO): 0 K/MM3 (ref 0–0.1)
BASOPHILS NFR BLD AUTO: 0.4 % (ref 0–1.8)
BUN SERPL-MCNC: 6 MG/DL (ref 9–20)
BUN/CREAT SERPL: 9 %
CALCIUM SERPL-MCNC: 8.6 MG/DL (ref 8.4–10.2)
EOSINOPHIL # BLD AUTO: 0.1 K/MM3 (ref 0–0.4)
EOSINOPHIL NFR BLD AUTO: 1.3 % (ref 0–4.3)
HCT VFR BLD CALC: 38.2 % (ref 35.5–45.6)
HEMOLYSIS INDEX: 5
HGB BLD-MCNC: 13 GM/DL (ref 11.8–15.2)
LYMPHOCYTES # BLD AUTO: 1.2 K/MM3 (ref 1.2–5.4)
LYMPHOCYTES NFR BLD AUTO: 14.9 % (ref 13.4–35)
MCHC RBC AUTO-ENTMCNC: 34 % (ref 32–34)
MCV RBC AUTO: 92 FL (ref 84–94)
MONOCYTES # (AUTO): 1 K/MM3 (ref 0–0.8)
MONOCYTES % (AUTO): 12.3 % (ref 0–7.3)
PLATELET # BLD: 295 K/MM3 (ref 140–440)
RBC # BLD AUTO: 4.18 M/MM3 (ref 3.65–5.03)

## 2021-08-03 RX ADMIN — PANTOPRAZOLE SODIUM SCH MG: 40 INJECTION, POWDER, FOR SOLUTION INTRAVENOUS at 10:24

## 2021-08-03 RX ADMIN — PIPERACILLIN AND TAZOBACTAM SCH MLS/HR: 4; .5 INJECTION, POWDER, FOR SOLUTION INTRAVENOUS at 05:22

## 2021-08-03 RX ADMIN — Medication SCH: at 14:38

## 2021-08-03 RX ADMIN — HYDROMORPHONE HYDROCHLORIDE PRN MG: 1 INJECTION, SOLUTION INTRAMUSCULAR; INTRAVENOUS; SUBCUTANEOUS at 05:35

## 2021-08-03 RX ADMIN — Medication SCH APPLICATIO: at 05:24

## 2021-08-03 RX ADMIN — Medication SCH ML: at 10:24

## 2021-08-03 NOTE — DISCHARGE SUMMARY
Providers





- Providers


Date of Admission: 


07/27/21 16:51





Date of discharge: 08/03/21


Attending physician: 


JOEY HAM





                                        





07/27/21


Consult to Case Management [CONS] Routine 


   Services Needed at Discharge: Home Health Services


   Notified:: cm notified





07/27/21 16:44


Consult to Physician [CONS] Stat 


   Comment: 


   Consulting Provider: MARISELA OJEDA


   Physician Instructions: 


   Reason For Exam: perforated viscus











Primary care physician: 


PRIMARY CARE MD








Hospitalization


Condition: Stable


Pertinent studies: 





EGD which showed gastric ulcer.


Hospital course: 





41-year-old male presents with acute abdominal pain found to be secondary to 

perforated abdominal viscus exacerbated by ileus.  Patient underwent surgical 

intervention with exploratory lap and Davian patch repair.  Patient did well 

tolerated repair of the ulcer.  No further evidence of bleeding.  No further 

abdominal pain.  Patient able to tolerate p.o. and was stable for discharge.  

Patient be discharged with follow-up with surgery in 5 days and GI in 7 to 10 

days.


Disposition: DC-01 TO HOME OR SELFCARE


Final Discharge Diagnosis (Prints w/discharge instructions): Perforated 

abdominal viscus  2. peptic ulcer disease





- Discharge Diagnoses


(1) Dehydration


Status: Acute   





(2) Perforated abdominal viscus


Status: Acute   





(3) Peritonitis


Status: Acute   





Core Measure Documentation





- Palliative Care


Palliative Care/ Comfort Measures: Not Applicable





- Core Measures


Any of the following diagnoses?: none





Exam





- Constitutional


Vitals: 


                                        











Temp Pulse Resp BP Pulse Ox


 


 98.7 F   79   16   130/75   98 


 


 08/03/21 07:25  08/03/21 07:25  08/03/21 07:25  08/03/21 07:25  08/03/21 08:44











General appearance: Present: no acute distress, well-nourished





- EENT


Eyes: Present: PERRL


ENT: hearing intact, clear oral mucosa





- Neck


Neck: Present: supple, normal ROM





- Respiratory


Respiratory effort: normal


Respiratory: bilateral: CTA





- Cardiovascular


Heart Sounds: Present: S1 & S2.  Absent: rub, click





- Extremities


Extremities: pulses symmetrical, No edema


Peripheral Pulses: within normal limits





- Abdominal


General gastrointestinal: Present: soft, non-tender, non-distended, normal bowel

 sounds


Male genitourinary: Present: normal





- Integumentary


Integumentary: Present: clear, warm, dry





- Musculoskeletal


Musculoskeletal: gait normal, strength equal bilaterally





- Psychiatric


Psychiatric: appropriate mood/affect, intact judgment & insight





- Neurologic


Neurologic: CNII-XII intact, moves all extremities





Plan


Activity: fall precautions


Weight Bearing Status: Full Weight Bearing


Wound: per wound nurse instructions, other (As per wound treatment nurse)


Special Instructions: home health RN


Follow up with: 


PRIMARY CARE,MD [Primary Care Provider] - 3-5 Days


TRINITY OJEDA NP [Referring] - 7 Days


MARISELA OJEDA MD [Staff Physician] - 7 Days

## 2023-10-06 NOTE — FLUOROSCOPY REPORT
Needs a refill on albuterol sulfate inhaler sent to [facility]   UPPER GI



HISTORY: s/p repair of perforated gastric ulcer.



TECHNIQUE: Single contrast Gastrografin technique.



FINDINGS: Modified upper GI was performed to assess for integrity perforated gastric ulcer repair. Ap
proximately 100 cc of Gastrografin agent was ingested. The distal esophagus is unremarkable. There is
 normal filling of the gastric cavity and duodenum. No evidence for obstruction or extravasation of c
ontrast.



IMPRESSION:  No evidence for extravasation of contrast.



Fluoroscopic time: 1.0 minutes



Number of fluoroscopic images:  22



Signer Name: Mor Dale Jr, MD 

Signed: 8/2/2021 9:01 AM

Workstation Name: [id number]